# Patient Record
Sex: MALE | Race: WHITE | URBAN - METROPOLITAN AREA
[De-identification: names, ages, dates, MRNs, and addresses within clinical notes are randomized per-mention and may not be internally consistent; named-entity substitution may affect disease eponyms.]

---

## 2023-01-10 ENCOUNTER — EMERGENCY (EMERGENCY)
Facility: HOSPITAL | Age: 33
LOS: 1 days | Discharge: HOME | End: 2023-01-10
Attending: STUDENT IN AN ORGANIZED HEALTH CARE EDUCATION/TRAINING PROGRAM | Admitting: STUDENT IN AN ORGANIZED HEALTH CARE EDUCATION/TRAINING PROGRAM
Payer: MEDICAID

## 2023-01-10 VITALS
HEART RATE: 100 BPM | SYSTOLIC BLOOD PRESSURE: 148 MMHG | DIASTOLIC BLOOD PRESSURE: 66 MMHG | TEMPERATURE: 99 F | RESPIRATION RATE: 18 BRPM | OXYGEN SATURATION: 98 %

## 2023-01-10 VITALS
OXYGEN SATURATION: 98 % | HEIGHT: 72 IN | TEMPERATURE: 98 F | RESPIRATION RATE: 20 BRPM | DIASTOLIC BLOOD PRESSURE: 64 MMHG | SYSTOLIC BLOOD PRESSURE: 129 MMHG | HEART RATE: 117 BPM | WEIGHT: 179.9 LBS

## 2023-01-10 DIAGNOSIS — F14.10 COCAINE ABUSE, UNCOMPLICATED: ICD-10-CM

## 2023-01-10 DIAGNOSIS — F19.90 OTHER PSYCHOACTIVE SUBSTANCE USE, UNSPECIFIED, UNCOMPLICATED: ICD-10-CM

## 2023-01-10 LAB
ALBUMIN SERPL ELPH-MCNC: 4.6 G/DL — SIGNIFICANT CHANGE UP (ref 3.3–5)
ALP SERPL-CCNC: 83 U/L — SIGNIFICANT CHANGE UP (ref 40–120)
ALT FLD-CCNC: 65 U/L — HIGH (ref 10–45)
ANION GAP SERPL CALC-SCNC: 16 MMOL/L — SIGNIFICANT CHANGE UP (ref 5–17)
ANISOCYTOSIS BLD QL: SLIGHT — SIGNIFICANT CHANGE UP
APAP SERPL-MCNC: <5 UG/ML — LOW (ref 10–30)
APPEARANCE UR: CLEAR — SIGNIFICANT CHANGE UP
APTT BLD: 33.3 SEC — SIGNIFICANT CHANGE UP (ref 27.5–35.5)
AST SERPL-CCNC: 89 U/L — HIGH (ref 10–40)
BASOPHILS # BLD AUTO: 0 K/UL — SIGNIFICANT CHANGE UP (ref 0–0.2)
BASOPHILS NFR BLD AUTO: 0 % — SIGNIFICANT CHANGE UP (ref 0–2)
BILIRUB SERPL-MCNC: 0.7 MG/DL — SIGNIFICANT CHANGE UP (ref 0.2–1.2)
BILIRUB UR-MCNC: NEGATIVE — SIGNIFICANT CHANGE UP
BUN SERPL-MCNC: 9 MG/DL — SIGNIFICANT CHANGE UP (ref 7–23)
CALCIUM SERPL-MCNC: 9.1 MG/DL — SIGNIFICANT CHANGE UP (ref 8.4–10.5)
CHLORIDE SERPL-SCNC: 99 MMOL/L — SIGNIFICANT CHANGE UP (ref 96–108)
CO2 SERPL-SCNC: 24 MMOL/L — SIGNIFICANT CHANGE UP (ref 22–31)
COLOR SPEC: YELLOW — SIGNIFICANT CHANGE UP
CREAT SERPL-MCNC: 0.62 MG/DL — SIGNIFICANT CHANGE UP (ref 0.5–1.3)
DACRYOCYTES BLD QL SMEAR: SLIGHT — SIGNIFICANT CHANGE UP
DIFF PNL FLD: NEGATIVE — SIGNIFICANT CHANGE UP
EGFR: 129 ML/MIN/1.73M2 — SIGNIFICANT CHANGE UP
EOSINOPHIL # BLD AUTO: 0.19 K/UL — SIGNIFICANT CHANGE UP (ref 0–0.5)
EOSINOPHIL NFR BLD AUTO: 1.7 % — SIGNIFICANT CHANGE UP (ref 0–6)
ETHANOL SERPL-MCNC: 64 MG/DL — HIGH (ref 0–10)
GLUCOSE SERPL-MCNC: 84 MG/DL — SIGNIFICANT CHANGE UP (ref 70–99)
GLUCOSE UR QL: NEGATIVE — SIGNIFICANT CHANGE UP
HCT VFR BLD CALC: 48.4 % — SIGNIFICANT CHANGE UP (ref 39–50)
HGB BLD-MCNC: 16.9 G/DL — SIGNIFICANT CHANGE UP (ref 13–17)
INR BLD: 0.99 — SIGNIFICANT CHANGE UP (ref 0.88–1.16)
KETONES UR-MCNC: ABNORMAL MG/DL
LEUKOCYTE ESTERASE UR-ACNC: NEGATIVE — SIGNIFICANT CHANGE UP
LYMPHOCYTES # BLD AUTO: 0.39 K/UL — LOW (ref 1–3.3)
LYMPHOCYTES # BLD AUTO: 3.5 % — LOW (ref 13–44)
MACROCYTES BLD QL: SLIGHT — SIGNIFICANT CHANGE UP
MANUAL SMEAR VERIFICATION: SIGNIFICANT CHANGE UP
MCHC RBC-ENTMCNC: 33.1 PG — SIGNIFICANT CHANGE UP (ref 27–34)
MCHC RBC-ENTMCNC: 34.9 GM/DL — SIGNIFICANT CHANGE UP (ref 32–36)
MCV RBC AUTO: 94.9 FL — SIGNIFICANT CHANGE UP (ref 80–100)
MONOCYTES # BLD AUTO: 0.58 K/UL — SIGNIFICANT CHANGE UP (ref 0–0.9)
MONOCYTES NFR BLD AUTO: 5.2 % — SIGNIFICANT CHANGE UP (ref 2–14)
NEUTROPHILS # BLD AUTO: 9.94 K/UL — HIGH (ref 1.8–7.4)
NEUTROPHILS NFR BLD AUTO: 88.7 % — HIGH (ref 43–77)
NEUTS BAND # BLD: 0.9 % — SIGNIFICANT CHANGE UP (ref 0–8)
NITRITE UR-MCNC: NEGATIVE — SIGNIFICANT CHANGE UP
OVALOCYTES BLD QL SMEAR: SLIGHT — SIGNIFICANT CHANGE UP
PH UR: 6 — SIGNIFICANT CHANGE UP (ref 5–8)
PLAT MORPH BLD: NORMAL — SIGNIFICANT CHANGE UP
PLATELET # BLD AUTO: 283 K/UL — SIGNIFICANT CHANGE UP (ref 150–400)
POIKILOCYTOSIS BLD QL AUTO: SLIGHT — SIGNIFICANT CHANGE UP
POLYCHROMASIA BLD QL SMEAR: SLIGHT — SIGNIFICANT CHANGE UP
POTASSIUM SERPL-MCNC: 3.8 MMOL/L — SIGNIFICANT CHANGE UP (ref 3.5–5.3)
POTASSIUM SERPL-SCNC: 3.8 MMOL/L — SIGNIFICANT CHANGE UP (ref 3.5–5.3)
PROT SERPL-MCNC: 7.5 G/DL — SIGNIFICANT CHANGE UP (ref 6–8.3)
PROT UR-MCNC: NEGATIVE MG/DL — SIGNIFICANT CHANGE UP
PROTHROM AB SERPL-ACNC: 11.8 SEC — SIGNIFICANT CHANGE UP (ref 10.5–13.4)
RAPID RVP RESULT: DETECTED
RBC # BLD: 5.1 M/UL — SIGNIFICANT CHANGE UP (ref 4.2–5.8)
RBC # FLD: 13.3 % — SIGNIFICANT CHANGE UP (ref 10.3–14.5)
RBC BLD AUTO: ABNORMAL
RV+EV RNA SPEC QL NAA+PROBE: DETECTED
SALICYLATES SERPL-MCNC: <0.3 MG/DL — LOW (ref 2.8–20)
SARS-COV-2 RNA SPEC QL NAA+PROBE: SIGNIFICANT CHANGE UP
SODIUM SERPL-SCNC: 139 MMOL/L — SIGNIFICANT CHANGE UP (ref 135–145)
SP GR SPEC: 1.02 — SIGNIFICANT CHANGE UP (ref 1–1.03)
TSH SERPL-MCNC: 1.02 UIU/ML — SIGNIFICANT CHANGE UP (ref 0.27–4.2)
UROBILINOGEN FLD QL: 1 E.U./DL — SIGNIFICANT CHANGE UP
WBC # BLD: 11.09 K/UL — HIGH (ref 3.8–10.5)
WBC # FLD AUTO: 11.09 K/UL — HIGH (ref 3.8–10.5)

## 2023-01-10 PROCEDURE — 90792 PSYCH DIAG EVAL W/MED SRVCS: CPT

## 2023-01-10 PROCEDURE — 80307 DRUG TEST PRSMV CHEM ANLYZR: CPT

## 2023-01-10 PROCEDURE — 85730 THROMBOPLASTIN TIME PARTIAL: CPT

## 2023-01-10 PROCEDURE — 0225U NFCT DS DNA&RNA 21 SARSCOV2: CPT

## 2023-01-10 PROCEDURE — 71046 X-RAY EXAM CHEST 2 VIEWS: CPT

## 2023-01-10 PROCEDURE — 81003 URINALYSIS AUTO W/O SCOPE: CPT

## 2023-01-10 PROCEDURE — 99285 EMERGENCY DEPT VISIT HI MDM: CPT

## 2023-01-10 PROCEDURE — 85025 COMPLETE CBC W/AUTO DIFF WBC: CPT

## 2023-01-10 PROCEDURE — 36415 COLL VENOUS BLD VENIPUNCTURE: CPT

## 2023-01-10 PROCEDURE — 85610 PROTHROMBIN TIME: CPT

## 2023-01-10 PROCEDURE — 93005 ELECTROCARDIOGRAM TRACING: CPT

## 2023-01-10 PROCEDURE — 71046 X-RAY EXAM CHEST 2 VIEWS: CPT | Mod: 26

## 2023-01-10 PROCEDURE — 80053 COMPREHEN METABOLIC PANEL: CPT

## 2023-01-10 PROCEDURE — 84443 ASSAY THYROID STIM HORMONE: CPT

## 2023-01-10 RX ORDER — SODIUM CHLORIDE 9 MG/ML
1000 INJECTION INTRAMUSCULAR; INTRAVENOUS; SUBCUTANEOUS ONCE
Refills: 0 | Status: COMPLETED | OUTPATIENT
Start: 2023-01-10 | End: 2023-01-10

## 2023-01-10 RX ADMIN — SODIUM CHLORIDE 1000 MILLILITER(S): 9 INJECTION INTRAMUSCULAR; INTRAVENOUS; SUBCUTANEOUS at 10:55

## 2023-01-10 RX ADMIN — Medication 50 MILLIGRAM(S): at 12:27

## 2023-01-10 NOTE — ED PROVIDER NOTE - OBJECTIVE STATEMENT
31 y/o M with no PMHx and remote h/o back surgery presents to ED with 2 days of cough with nonbloody sputum production, myalgias, chills, and SOB. Pt also reports depression, anxiety, and intermittent thoughts of self harm. Denies any current thoughts of SI, VH, or AH. Pt states he smokes, drinks, and uses cocaine via snorting but denies h/o IVDU. Last used cocaine yesterday. Denies chest pain, leg swelling, nausea, vomiting, hormone use, recent travel/surgeries/hospitalizations, or personal/FHx of DVT/PE.

## 2023-01-10 NOTE — ED PROVIDER NOTE - PATIENT PORTAL LINK FT
You can access the FollowMyHealth Patient Portal offered by [facility] by registering at the following website: [email or web address]. By joining [facility]’s FollowMyHealth portal, you will also be able to view your health information using other applications (apps) compatible with our system.

## 2023-01-10 NOTE — ED BEHAVIORAL HEALTH ASSESSMENT NOTE - SAFETY PLAN ADDT'L DETAILS
Provision of National Suicide Prevention Lifeline 5-250-787-TALK (5177) Provision of National Suicide Prevention Lifeline 5-719-156-TALK

## 2023-01-10 NOTE — ED PROVIDER NOTE - PHYSICAL EXAMINATION
GENERAL: Well appearing, awake, alert and in NAD  ENMT: Airway patent.   EYES: Conjunctiva clear.   CARDIAC: Regular rate, regular rhythm.  Heart sounds S1, S2, no S3, S4. No murmurs, rubs or gallops.  RESPIRATORY: Breath sounds clear and equal in bilateral anterior lung fields, no wheezes/rhonchi/stridor; pt breathing and speaking comfortably with no increased WOB. No accessory mm. use, no intercostal retractions, no nasal flaring.   GI: Abdomen soft, non-distended, non-tender, no rebound or guarding.  MSK: No b/l LE edema.   PSYCH: Calm and cooperative.

## 2023-01-10 NOTE — ED PROVIDER NOTE - NSFOLLOWUPINSTRUCTIONS_ED_ALL_ED_FT
Please reach out to Izabella Johnson ([facility] ED clinical referral coordinator) to assist you with your follow-up appointment.     Monday - Friday 11am-7pm  [phone]  [email or web address]     1. You were seen for cough. A copy of any of your resulted labs, imaging, and findings have been provided to you. Make sure to view any test results that may not have yet resulted at the time of your discharge by creating a FollowMyHealth account at: [email or web address] to sign up for FollowMyHealth. Please review all of your lab, imaging, and all other results in their entirety with your primary care doctor.   2. Continue to take your home medications as prescribed.  librium from your pharmacy and take the medication as prescribed on the bottle's manufacterer's label, and consult a pharmacist or your primary care doctor with any questions.   3. Follow up with addiction services, a psychiatrist, and your primary care doctor within 48 hours to assess the symptoms you were seen for in the emergency department and to review all results from your visit. If you don't have a doctor, call [phone] to make an appointment.  4. Return immediately to the emergency department for new, persistent, or worsening symptoms or signs. Return immediately to the emergency department if you have chest pain, shortness of breath, loss of consciousness, thoughts of self-harm, or shortness of breath.   5. For your for health, you should make healthy food choices and be physically active. Also, you should not smoke or use drugs, and you should not drink alcohol in excess. Please visit [email or web address] for resources and more information.

## 2023-01-10 NOTE — ED BEHAVIORAL HEALTH ASSESSMENT NOTE - RISK ASSESSMENT
Static: Hx of substance abuse  Modifiable: Not engaged in tx  Protective: Fear of death/dying   Current Risk: Low  Current risk will be mitigated by tending to pt needs.
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
## 2023-01-10 NOTE — ED PROVIDER NOTE - PROGRESS NOTE DETAILS
pt cleared by psychiatry who say 1:1 can be discontinued resources given to pt for substance abuse resources awaiting labs results HR improved, etoh level 60s, mild transaminitis, entero/rhinovirus+, cxr clear. On reassessment pt awake alert, states he is hungry. no nystagmus bilat gait wnl mild tremors in hands mild tongue fasciculations. pt given librium. ciwa 3. pt given addition service resources and a few days course of librium. is amenable to followup outpatient with addiction service and psychiatric resources. Patient with capacity and insight, in control of faculties, speaking in full sentences without slurring of words, no nystagmus, no ataxia, and appropriate without further medical complaint.

## 2023-01-10 NOTE — ED PROVIDER NOTE - CLINICAL SUMMARY MEDICAL DECISION MAKING FREE TEXT BOX
33 y/o M presents to ED with 2 days of cough with nonbloody sputum production, myalgias, chills, and SOB. Pt also reports depression, anxiety, and intermittent thoughts of self harm.    On exam, HR is 117. Vital signs are otherwise normal. No remarkable exam findings.     DDx: viral illness vs flu vs RSV vs pneumonia vs intoxication vs SI vs depression. Pt is tachycardic with SOB, however this is associated with viral syndrome. Pt does not have any DVT or PE risk factors.     Plan:   - labs including labs for psych clearance  - EKG  - CXR  - UA   - RVP  - 1:1 ordered  - IV fluids  - psych consult  - reassess

## 2023-01-13 DIAGNOSIS — R74.01 ELEVATION OF LEVELS OF LIVER TRANSAMINASE LEVELS: ICD-10-CM

## 2023-01-13 DIAGNOSIS — F14.90 COCAINE USE, UNSPECIFIED, UNCOMPLICATED: ICD-10-CM

## 2023-01-13 DIAGNOSIS — B34.1 ENTEROVIRUS INFECTION, UNSPECIFIED: ICD-10-CM

## 2023-01-13 DIAGNOSIS — F17.200 NICOTINE DEPENDENCE, UNSPECIFIED, UNCOMPLICATED: ICD-10-CM

## 2023-01-13 DIAGNOSIS — F41.8 OTHER SPECIFIED ANXIETY DISORDERS: ICD-10-CM

## 2023-01-13 DIAGNOSIS — R25.3 FASCICULATION: ICD-10-CM

## 2023-01-13 DIAGNOSIS — R05.1 ACUTE COUGH: ICD-10-CM

## 2023-01-13 DIAGNOSIS — Z20.822 CONTACT WITH AND (SUSPECTED) EXPOSURE TO COVID-19: ICD-10-CM

## 2023-01-21 ENCOUNTER — EMERGENCY (EMERGENCY)
Facility: HOSPITAL | Age: 33
LOS: 1 days | Discharge: HOME | End: 2023-01-21
Admitting: EMERGENCY MEDICINE
Payer: MEDICAID

## 2023-01-21 VITALS
TEMPERATURE: 98 F | WEIGHT: 179.9 LBS | SYSTOLIC BLOOD PRESSURE: 130 MMHG | DIASTOLIC BLOOD PRESSURE: 88 MMHG | HEIGHT: 72 IN | HEART RATE: 107 BPM | OXYGEN SATURATION: 98 % | RESPIRATION RATE: 16 BRPM

## 2023-01-21 VITALS
OXYGEN SATURATION: 99 % | DIASTOLIC BLOOD PRESSURE: 80 MMHG | HEART RATE: 78 BPM | RESPIRATION RATE: 17 BRPM | SYSTOLIC BLOOD PRESSURE: 111 MMHG

## 2023-01-21 PROCEDURE — 72125 CT NECK SPINE W/O DYE: CPT | Mod: 26

## 2023-01-21 PROCEDURE — 70450 CT HEAD/BRAIN W/O DYE: CPT | Mod: 26

## 2023-01-21 PROCEDURE — 99284 EMERGENCY DEPT VISIT MOD MDM: CPT

## 2023-01-21 RX ORDER — MIDAZOLAM HYDROCHLORIDE 1 MG/ML
5 INJECTION, SOLUTION INTRAMUSCULAR; INTRAVENOUS ONCE
Refills: 0 | Status: DISCONTINUED | OUTPATIENT
Start: 2023-01-21 | End: 2023-01-21

## 2023-01-21 RX ORDER — HALOPERIDOL DECANOATE 100 MG/ML
5 INJECTION INTRAMUSCULAR ONCE
Refills: 0 | Status: COMPLETED | OUTPATIENT
Start: 2023-01-21 | End: 2023-01-21

## 2023-01-21 RX ORDER — HALOPERIDOL DECANOATE 100 MG/ML
5 INJECTION INTRAMUSCULAR ONCE
Refills: 0 | Status: DISCONTINUED | OUTPATIENT
Start: 2023-01-21 | End: 2023-01-24

## 2023-01-21 RX ADMIN — MIDAZOLAM HYDROCHLORIDE 5 MILLIGRAM(S): 1 INJECTION, SOLUTION INTRAMUSCULAR; INTRAVENOUS at 04:31

## 2023-01-21 RX ADMIN — HALOPERIDOL DECANOATE 5 MILLIGRAM(S): 100 INJECTION INTRAMUSCULAR at 04:30

## 2023-01-21 NOTE — ED ADULT NURSE NOTE - OBJECTIVE STATEMENT
31 y/o male patient here for eval of AMS; patient was bibems found on floor by beth benitez. patient is alert and verbal upon assessment. pending provider eval.

## 2023-01-21 NOTE — ED ADULT NURSE REASSESSMENT NOTE - NS ED NURSE REASSESS COMMENT FT1
Pt placed next to nursing station upon ED arrival. Pt then heard and seen by staff at nursing station attempting to get out of stretcher. Nursing staff and NISHA Lundy attempted to verbally redirect pt. Pt non-redirectable, combative, and physically aggressive. Staff attempted to get to pt prior to pt getting out of bed. Pt climbed out of stretcher and threw self on floor prior to staff being able to physically redirect pt. Abrasion noted to chin. No LOC. Pt denies pain. Pt placed next to nursing station upon ED arrival. Pt then heard and seen by staff at nursing station attempting to get out of stretcher. Nursing staff and NISHA Lundy attempted to verbally redirect pt. Pt non-redirectable, combative, and physically aggressive. Staff attempted to get to pt prior to pt getting out of bed. Pt climbed out of stretcher and threw self on floor prior to staff being able to physically redirect pt. Abrasion noted to chin. No LOC. Pt denies pain. MD Welch at bedside for evaluation of patient post fall.

## 2023-01-21 NOTE — ED PROVIDER NOTE - NSFOLLOWUPINSTRUCTIONS_ED_ALL_ED_FT
Follow up with your primary care doctor or clinics listed below if you do not have a doctor  [address] [address]  To make an appointment, call [phone]  [facility]  Address: [address] [place] [id number]  Appointment Center: [phone] ([phone])     Return for any concerns

## 2023-01-21 NOTE — ED PROVIDER NOTE - PATIENT PORTAL LINK FT
You can access the FollowMyHealth Patient Portal offered by [facility] by registering at the following website: [email or web address]. By joining [employer]’s FollowMyHealth portal, you will also be able to view your health information using other applications (apps) compatible with our system.

## 2023-01-21 NOTE — ED ADULT NURSE REASSESSMENT NOTE - NS ED NURSE REASSESS COMMENT FT1
rec'd patient in yellow gown, is awake and alert following simple commands, awaiting CT results. repeat VSS during change of shift are WNL. Will monitor closely

## 2023-01-21 NOTE — ED PROVIDER NOTE - OBJECTIVE STATEMENT
31 yo m bibems for AMS, possibly 2/2 substance use, no trauma reported.  limited history 2/2 mental status.    I have reviewed available current nursing and previous documentation of past medical, surgical, family, and/or social history.
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
## 2023-01-21 NOTE — ED ADULT NURSE NOTE - NSSUHOSCREENINGYN_ED_ALL_ED
----- Message from Diana Jiang sent at 7/11/2018  2:17 PM CDT -----  Contact: patient mother  Please call above patient mother missed your call   Yes - the patient is able to be screened

## 2023-01-23 DIAGNOSIS — R45.1 RESTLESSNESS AND AGITATION: ICD-10-CM

## 2023-01-23 DIAGNOSIS — R41.82 ALTERED MENTAL STATUS, UNSPECIFIED: ICD-10-CM

## 2023-01-23 DIAGNOSIS — F10.99 ALCOHOL USE, UNSPECIFIED WITH UNSPECIFIED ALCOHOL-INDUCED DISORDER: ICD-10-CM

## 2023-06-18 ENCOUNTER — EMERGENCY (EMERGENCY)
Facility: HOSPITAL | Age: 33
LOS: 1 days | Discharge: HOME | End: 2023-06-18
Attending: EMERGENCY MEDICINE | Admitting: EMERGENCY MEDICINE
Payer: MEDICAID

## 2023-06-18 VITALS
HEIGHT: 73 IN | OXYGEN SATURATION: 96 % | SYSTOLIC BLOOD PRESSURE: 115 MMHG | HEART RATE: 104 BPM | WEIGHT: 186.07 LBS | TEMPERATURE: 99 F | RESPIRATION RATE: 18 BRPM | DIASTOLIC BLOOD PRESSURE: 80 MMHG
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
## 2023-06-18 DIAGNOSIS — S70.01XA CONTUSION OF RIGHT HIP, INITIAL ENCOUNTER: ICD-10-CM

## 2023-06-18 DIAGNOSIS — S80.811A ABRASION, RIGHT LOWER LEG, INITIAL ENCOUNTER: ICD-10-CM

## 2023-06-18 DIAGNOSIS — R29.6 REPEATED FALLS: ICD-10-CM
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
## 2023-06-18 DIAGNOSIS — S80.812A ABRASION, LEFT LOWER LEG, INITIAL ENCOUNTER: ICD-10-CM

## 2023-06-18 PROCEDURE — 99284 EMERGENCY DEPT VISIT MOD MDM: CPT

## 2023-06-18 PROCEDURE — 73502 X-RAY EXAM HIP UNI 2-3 VIEWS: CPT

## 2023-06-18 PROCEDURE — 73502 X-RAY EXAM HIP UNI 2-3 VIEWS: CPT | Mod: 26,RT

## 2023-06-18 PROCEDURE — 71046 X-RAY EXAM CHEST 2 VIEWS: CPT

## 2023-06-18 PROCEDURE — 71046 X-RAY EXAM CHEST 2 VIEWS: CPT | Mod: 26

## 2023-06-18 PROCEDURE — 99284 EMERGENCY DEPT VISIT MOD MDM: CPT | Mod: 25

## 2023-06-18 RX ORDER — ACETAMINOPHEN 500 MG
650 TABLET ORAL ONCE
Refills: 0 | Status: COMPLETED | OUTPATIENT
Start: 2023-06-18 | End: 2023-06-18

## 2023-06-18 RX ADMIN — Medication 650 MILLIGRAM(S): at 12:55

## 2023-06-18 NOTE — ED ADULT NURSE NOTE - NSFALLRISKINTERV_ED_ALL_ED

## 2023-06-18 NOTE — ED ADULT NURSE NOTE - NSFALLTYPE_ED_ALL_ED
Refill Approved    Rx renewed per Medication Renewal Policy. Medication was last renewed on 10/8/19, last OV 12/2/19.    Lisa Esparza, [facility] Triage/Med Refill 7/4/2020     Requested Prescriptions   Pending Prescriptions Disp Refills     atorvastatin (LIPITOR) 10 MG tablet [Pharmacy Med Name: ATORVASTATIN 10MG TABLETS] 90 tablet 2     Sig: TAKE 1 TABLET BY MOUTH EVERY DAY       Statins Refill Protocol (Hmg CoA Reductase Inhibitors) Passed - 7/3/2020  9:03 AM        Passed - PCP or prescribing provider visit in past 12 months      Last office visit with prescriber/PCP: 12/2/2019 Deepa Short MD OR same dept: 12/2/2019 Deepa Short MD OR same specialty: 12/2/2019 Deepa Short MD  Last physical: Visit date not found Last MTM visit: Visit date not found   Next visit within 3 mo: Visit date not found  Next physical within 3 mo: Visit date not found  Prescriber OR PCP: Deepa Short MD  Last diagnosis associated with med order: 1. Serum Total Cholesterol Was Elevated  - atorvastatin (LIPITOR) 10 MG tablet [Pharmacy Med Name: ATORVASTATIN 10MG TABLETS]; TAKE 1 TABLET BY MOUTH EVERY DAY  Dispense: 90 tablet; Refill: 2    If protocol passes may refill for 12 months if within 3 months of last provider visit (or a total of 15 months).                             Single mechanical
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
## 2023-06-18 NOTE — ED PROVIDER NOTE - PHYSICAL EXAMINATION
CONSTITUTIONAL: In no apparent distress.   HEAD: Normocephalic; atraumatic.   EYES:  conjunctiva and sclera clear  ENT: normal nose; no rhinorrhea; normal pharynx with no erythema or lesions.   NECK: Supple; non-tender;   CARDIOVASCULAR: Normal S1, S2; no murmurs, rubs, or gallops. Regular rate and rhythm.   RESPIRATORY: Breathing easily; breath sounds clear and equal bilaterally; no wheezes, rhonchi, or rales.  GI: Soft; non-distended; non-tender; no palpable organomegaly.   EXT: No cyanosis or edema; N/V intact   BACK: no C, T and L spinal tenderness. No bruising seen over R hip pointer w/ tenderness. Ribs are nontender to touch.   SKIN: Normal for age and race; warm; dry; good turgor; no apparent lesions or rash.   NEURO: A & O x 3; face symmetric; grossly unremarkable.   PSYCHOLOGICAL: The patient’s mood and manner are appropriate.
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
## 2023-06-18 NOTE — ED PROVIDER NOTE - PATIENT PORTAL LINK FT
You can access the FollowMyHealth Patient Portal offered by [facility] by registering at the following website: [email or web address]. By joining [employer]’s FollowMyHealth portal, you will also be able to view your health information using other applications (apps) compatible with our system.

## 2023-07-12 ENCOUNTER — EMERGENCY (EMERGENCY)
Facility: HOSPITAL | Age: 33
LOS: 1 days | Discharge: HOME | End: 2023-07-12
Attending: EMERGENCY MEDICINE | Admitting: EMERGENCY MEDICINE
Payer: MEDICAID

## 2023-07-12 VITALS
DIASTOLIC BLOOD PRESSURE: 80 MMHG | OXYGEN SATURATION: 100 % | RESPIRATION RATE: 18 BRPM | SYSTOLIC BLOOD PRESSURE: 114 MMHG | TEMPERATURE: 98 F | HEART RATE: 75 BPM

## 2023-07-12 VITALS
RESPIRATION RATE: 20 BRPM | WEIGHT: 169.98 LBS | HEART RATE: 95 BPM | HEIGHT: 73 IN | OXYGEN SATURATION: 95 % | DIASTOLIC BLOOD PRESSURE: 92 MMHG | SYSTOLIC BLOOD PRESSURE: 135 MMHG | TEMPERATURE: 99 F

## 2023-07-12 VITALS
WEIGHT: 169.98 LBS | DIASTOLIC BLOOD PRESSURE: 88 MMHG | RESPIRATION RATE: 18 BRPM | SYSTOLIC BLOOD PRESSURE: 123 MMHG | OXYGEN SATURATION: 95 % | HEIGHT: 73 IN | TEMPERATURE: 99 F | HEART RATE: 107 BPM

## 2023-07-12 DIAGNOSIS — F10.90 ALCOHOL USE, UNSPECIFIED, UNCOMPLICATED: ICD-10-CM

## 2023-07-12 DIAGNOSIS — F10.239 ALCOHOL DEPENDENCE WITH WITHDRAWAL, UNSPECIFIED: ICD-10-CM

## 2023-07-12 DIAGNOSIS — Z71.51 DRUG ABUSE COUNSELING AND SURVEILLANCE OF DRUG ABUSER: ICD-10-CM

## 2023-07-12 PROBLEM — F10.10 ALCOHOL ABUSE, UNCOMPLICATED: Chronic | Status: ACTIVE | Noted: 2023-06-18

## 2023-07-12 LAB
ALBUMIN SERPL ELPH-MCNC: 3.7 G/DL — SIGNIFICANT CHANGE UP (ref 3.4–5)
ALP SERPL-CCNC: 113 U/L — SIGNIFICANT CHANGE UP (ref 40–120)
ALT FLD-CCNC: 63 U/L — HIGH (ref 12–42)
ANION GAP SERPL CALC-SCNC: 7 MMOL/L — LOW (ref 9–16)
APTT BLD: 29.7 SEC — SIGNIFICANT CHANGE UP (ref 27.5–35.5)
AST SERPL-CCNC: 50 U/L — HIGH (ref 15–37)
BASOPHILS # BLD AUTO: 0.02 K/UL — SIGNIFICANT CHANGE UP (ref 0–0.2)
BASOPHILS NFR BLD AUTO: 0.4 % — SIGNIFICANT CHANGE UP (ref 0–2)
BILIRUB DIRECT SERPL-MCNC: 0.2 MG/DL — SIGNIFICANT CHANGE UP (ref 0–0.3)
BILIRUB INDIRECT FLD-MCNC: 0.2 MG/DL — SIGNIFICANT CHANGE UP (ref 0.2–1)
BILIRUB SERPL-MCNC: 0.4 MG/DL — SIGNIFICANT CHANGE UP (ref 0.2–1.2)
BILIRUB SERPL-MCNC: 0.4 MG/DL — SIGNIFICANT CHANGE UP (ref 0.2–1.2)
BUN SERPL-MCNC: 3 MG/DL — LOW (ref 7–23)
CALCIUM SERPL-MCNC: 9 MG/DL — SIGNIFICANT CHANGE UP (ref 8.5–10.5)
CHLORIDE SERPL-SCNC: 100 MMOL/L — SIGNIFICANT CHANGE UP (ref 96–108)
CO2 SERPL-SCNC: 31 MMOL/L — SIGNIFICANT CHANGE UP (ref 22–31)
CREAT SERPL-MCNC: 0.92 MG/DL — SIGNIFICANT CHANGE UP (ref 0.5–1.3)
EGFR: 112 ML/MIN/1.73M2 — SIGNIFICANT CHANGE UP
EOSINOPHIL # BLD AUTO: 0.01 K/UL — SIGNIFICANT CHANGE UP (ref 0–0.5)
EOSINOPHIL NFR BLD AUTO: 0.2 % — SIGNIFICANT CHANGE UP (ref 0–6)
ETHANOL SERPL-MCNC: 10 MG/DL — HIGH
GLUCOSE BLDC GLUCOMTR-MCNC: 95 MG/DL — SIGNIFICANT CHANGE UP (ref 70–99)
GLUCOSE SERPL-MCNC: 98 MG/DL — SIGNIFICANT CHANGE UP (ref 70–99)
HCT VFR BLD CALC: 42.9 % — SIGNIFICANT CHANGE UP (ref 39–50)
HCT VFR BLD CALC: 47.3 % — SIGNIFICANT CHANGE UP (ref 39–50)
HGB BLD-MCNC: 15 G/DL — SIGNIFICANT CHANGE UP (ref 13–17)
HGB BLD-MCNC: 16.6 G/DL — SIGNIFICANT CHANGE UP (ref 13–17)
HIV 1 & 2 AB SERPL IA.RAPID: SIGNIFICANT CHANGE UP
IMM GRANULOCYTES NFR BLD AUTO: 0.2 % — SIGNIFICANT CHANGE UP (ref 0–0.9)
INR BLD: 1.07 — SIGNIFICANT CHANGE UP (ref 0.88–1.16)
LIDOCAIN IGE QN: 59 U/L — LOW (ref 73–393)
LYMPHOCYTES # BLD AUTO: 1.83 K/UL — SIGNIFICANT CHANGE UP (ref 1–3.3)
LYMPHOCYTES # BLD AUTO: 32.7 % — SIGNIFICANT CHANGE UP (ref 13–44)
MAGNESIUM SERPL-MCNC: 2.1 MG/DL — SIGNIFICANT CHANGE UP (ref 1.6–2.6)
MCHC RBC-ENTMCNC: 34.4 PG — HIGH (ref 27–34)
MCHC RBC-ENTMCNC: 34.6 PG — HIGH (ref 27–34)
MCHC RBC-ENTMCNC: 35 GM/DL — SIGNIFICANT CHANGE UP (ref 32–36)
MCHC RBC-ENTMCNC: 35.1 GM/DL — SIGNIFICANT CHANGE UP (ref 32–36)
MCV RBC AUTO: 97.9 FL — SIGNIFICANT CHANGE UP (ref 80–100)
MCV RBC AUTO: 98.8 FL — SIGNIFICANT CHANGE UP (ref 80–100)
MONOCYTES # BLD AUTO: 0.69 K/UL — SIGNIFICANT CHANGE UP (ref 0–0.9)
MONOCYTES NFR BLD AUTO: 12.3 % — SIGNIFICANT CHANGE UP (ref 2–14)
NEUTROPHILS # BLD AUTO: 3.04 K/UL — SIGNIFICANT CHANGE UP (ref 1.8–7.4)
NEUTROPHILS NFR BLD AUTO: 54.2 % — SIGNIFICANT CHANGE UP (ref 43–77)
NRBC # BLD: 0 /100 WBCS — SIGNIFICANT CHANGE UP (ref 0–0)
NRBC # BLD: 0 /100 WBCS — SIGNIFICANT CHANGE UP (ref 0–0)
OB PNL STL: NEGATIVE — SIGNIFICANT CHANGE UP
PCO2 BLDV: 45 MMHG — SIGNIFICANT CHANGE UP (ref 42–55)
PH BLDV: 7.48 — HIGH (ref 7.32–7.43)
PLATELET # BLD AUTO: 216 K/UL — SIGNIFICANT CHANGE UP (ref 150–400)
PLATELET # BLD AUTO: 266 K/UL — SIGNIFICANT CHANGE UP (ref 150–400)
PO2 BLDV: 51 MMHG — HIGH (ref 25–45)
POTASSIUM SERPL-MCNC: 4.4 MMOL/L — SIGNIFICANT CHANGE UP (ref 3.5–5.3)
POTASSIUM SERPL-SCNC: 4.4 MMOL/L — SIGNIFICANT CHANGE UP (ref 3.5–5.3)
PROT SERPL-MCNC: 8.1 G/DL — SIGNIFICANT CHANGE UP (ref 6.4–8.2)
PROTHROM AB SERPL-ACNC: 12.5 SEC — SIGNIFICANT CHANGE UP (ref 10.5–13.4)
RBC # BLD: 4.34 M/UL — SIGNIFICANT CHANGE UP (ref 4.2–5.8)
RBC # BLD: 4.83 M/UL — SIGNIFICANT CHANGE UP (ref 4.2–5.8)
RBC # FLD: 14.3 % — SIGNIFICANT CHANGE UP (ref 10.3–14.5)
RBC # FLD: 14.5 % — SIGNIFICANT CHANGE UP (ref 10.3–14.5)
SAO2 % BLDV: 84.2 % — SIGNIFICANT CHANGE UP (ref 67–88)
SODIUM SERPL-SCNC: 138 MMOL/L — SIGNIFICANT CHANGE UP (ref 132–145)
TROPONIN I, HIGH SENSITIVITY RESULT: 4.3 NG/L — SIGNIFICANT CHANGE UP
WBC # BLD: 5.6 K/UL — SIGNIFICANT CHANGE UP (ref 3.8–10.5)
WBC # BLD: 5.9 K/UL — SIGNIFICANT CHANGE UP (ref 3.8–10.5)
WBC # FLD AUTO: 5.6 K/UL — SIGNIFICANT CHANGE UP (ref 3.8–10.5)
WBC # FLD AUTO: 5.9 K/UL — SIGNIFICANT CHANGE UP (ref 3.8–10.5)

## 2023-07-12 PROCEDURE — 99284 EMERGENCY DEPT VISIT MOD MDM: CPT

## 2023-07-12 PROCEDURE — 71046 X-RAY EXAM CHEST 2 VIEWS: CPT | Mod: 26

## 2023-07-12 PROCEDURE — 74177 CT ABD & PELVIS W/CONTRAST: CPT | Mod: 26

## 2023-07-12 PROCEDURE — 99285 EMERGENCY DEPT VISIT HI MDM: CPT

## 2023-07-12 RX ORDER — THIAMINE MONONITRATE (VIT B1) 100 MG
100 TABLET ORAL ONCE
Refills: 0 | Status: COMPLETED | OUTPATIENT
Start: 2023-07-12 | End: 2023-07-12

## 2023-07-12 RX ORDER — IOHEXOL 300 MG/ML
30 INJECTION, SOLUTION INTRAVENOUS ONCE
Refills: 0 | Status: COMPLETED | OUTPATIENT
Start: 2023-07-12 | End: 2023-07-12

## 2023-07-12 RX ORDER — FAMOTIDINE 10 MG/ML
20 INJECTION INTRAVENOUS ONCE
Refills: 0 | Status: COMPLETED | OUTPATIENT
Start: 2023-07-12 | End: 2023-07-12

## 2023-07-12 RX ORDER — ONDANSETRON 8 MG/1
4 TABLET, FILM COATED ORAL ONCE
Refills: 0 | Status: COMPLETED | OUTPATIENT
Start: 2023-07-12 | End: 2023-07-12

## 2023-07-12 RX ORDER — DIAZEPAM 5 MG
5 TABLET ORAL ONCE
Refills: 0 | Status: DISCONTINUED | OUTPATIENT
Start: 2023-07-12 | End: 2023-07-12

## 2023-07-12 RX ORDER — SODIUM CHLORIDE 9 MG/ML
1000 INJECTION INTRAMUSCULAR; INTRAVENOUS; SUBCUTANEOUS ONCE
Refills: 0 | Status: COMPLETED | OUTPATIENT
Start: 2023-07-12 | End: 2023-07-12

## 2023-07-12 RX ORDER — LIDOCAINE 4 G/100G
30 CREAM TOPICAL ONCE
Refills: 0 | Status: COMPLETED | OUTPATIENT
Start: 2023-07-12 | End: 2023-07-12

## 2023-07-12 RX ORDER — ACETAMINOPHEN 500 MG
975 TABLET ORAL ONCE
Refills: 0 | Status: COMPLETED | OUTPATIENT
Start: 2023-07-12 | End: 2023-07-12

## 2023-07-12 RX ADMIN — SODIUM CHLORIDE 1000 MILLILITER(S): 9 INJECTION INTRAMUSCULAR; INTRAVENOUS; SUBCUTANEOUS at 13:33

## 2023-07-12 RX ADMIN — IOHEXOL 30 MILLILITER(S): 300 INJECTION, SOLUTION INTRAVENOUS at 13:27

## 2023-07-12 RX ADMIN — Medication 30 MILLILITER(S): at 06:22

## 2023-07-12 RX ADMIN — Medication 50 MILLIGRAM(S): at 13:27

## 2023-07-12 RX ADMIN — ONDANSETRON 4 MILLIGRAM(S): 8 TABLET, FILM COATED ORAL at 13:33

## 2023-07-12 RX ADMIN — Medication 100 MILLIGRAM(S): at 13:34

## 2023-07-12 RX ADMIN — LIDOCAINE 30 MILLILITER(S): 4 CREAM TOPICAL at 06:25

## 2023-07-12 RX ADMIN — Medication 5 MILLIGRAM(S): at 13:30

## 2023-07-12 RX ADMIN — Medication 30 MILLILITER(S): at 14:50

## 2023-07-12 RX ADMIN — FAMOTIDINE 20 MILLIGRAM(S): 10 INJECTION INTRAVENOUS at 06:22

## 2023-07-12 RX ADMIN — FAMOTIDINE 20 MILLIGRAM(S): 10 INJECTION INTRAVENOUS at 14:50

## 2023-07-12 RX ADMIN — Medication 975 MILLIGRAM(S): at 13:28

## 2023-07-12 NOTE — ED ADULT TRIAGE NOTE - CHIEF COMPLAINT QUOTE
Pt walked in c/o etoh withdrawal, tremors, blood tinged emesis, and LLQ abdominal pain x today. Pt reports his last drink was last night at 2330. Reports history of eoth withdrawal seizures. Sepsis called in triage.

## 2023-07-12 NOTE — ED PROVIDER NOTE - NSFOLLOWUPINSTRUCTIONS_ED_ALL_ED_FT
Gastritis, Adult  Outline of an adult's lower body with a close-up of the stomach, showing inflammation and an ulcer inside the stomach.   Gastritis is inflammation of the stomach. There are two kinds of gastritis:  Acute gastritis. This kind develops suddenly.  Chronic gastritis. This kind is much more common. It develops slowly and lasts for a long time.  Gastritis happens when the lining of the stomach becomes weak or gets damaged. Without treatment, gastritis can lead to stomach bleeding and ulcers.    What are the causes?  This condition may be caused by:  An infection.  Drinking too much alcohol.  Certain medicines. These include steroids, antibiotics, and some over-the-counter medicines, such as aspirin or ibuprofen.  Having too much acid in the stomach.  Having a disease of the stomach.  Other causes may include:  An allergic reaction.  Some cancer treatments (radiation).  Smoking cigarettes or the use of products that contain nicotine or tobacco.  In some cases, the cause of this condition is not known.    What increases the risk?  Having a disease of the intestines.  Having a disease in which the body's immune system attacks the body (autoimmune disease), such as Crohn's disease.  Using aspirin or ibuprofen and other NSAIDs to treat other conditions, such as heart disease or chronic pain.  Stress.  What are the signs or symptoms?  Symptoms of this condition include:  Pain or a burning sensation in the upper abdomen.  Nausea.  Vomiting.  An uncomfortable feeling of fullness after eating.  Weight loss.  Bad breath.  Blood in your vomit or stool (feces).  In some cases, there are no symptoms.    How is this diagnosed?  This condition may be diagnosed based on your medical history, a physical exam, and tests. Tests may include:  Your medical history and a description of your symptoms.  A physical exam.  Tests. These can include:  Blood tests.  Stool tests.  A test in which a thin, flexible instrument with a light and a camera is passed down the esophagus and into the stomach (upper endoscopy).  A test in which a tissue sample is removed to look at it under a microscope (biopsy).  How is this treated?  This condition may be treated with medicines. The medicines that are used vary depending on the cause of the gastritis.  If the condition is caused by a bacterial infection, you may be given antibiotic medicines.  If the condition is caused by too much acid in the stomach, you may be given medicines called H2 blockers, proton pump inhibitors, or antacids.  Treatment may also involve stopping the use of certain medicines such as aspirin or ibuprofen and other NSAIDs.    Follow these instructions at home:  Medicines    Take over-the-counter and prescription medicines only as told by your health care provider.  If you were prescribed an antibiotic medicine, take it as told by your health care provider. Do not stop taking the antibiotic even if you start to feel better.  Alcohol use    Do not drink alcohol if:  Your health care provider tells you not to drink.  You are pregnant, may be pregnant, or are planning to become pregnant.  If you drink alcohol:  Limit your use to:  0–1 drink a day for women.  0–2 drinks a day for men.  Know how much alcohol is in your drink. In the [place] one drink equals one 12 oz bottle of beer (355 mL), one 5 oz glass of wine (148 mL), or one 1½ oz glass of hard liquor (44 mL).  General instructions    A comparison of three sample cups showing dark yellow, yellow, and pale yellow urine.  Eat small, frequent meals instead of large meals.  Avoid foods and drinks that make your symptoms worse.  Talk with your health care provider about ways to manage stress, such as getting regular exercise or practicing deep breathing, meditation, or yoga.  Do not use any products that contain nicotine or tobacco. These products include cigarettes, chewing tobacco, and vaping devices, such as e-cigarettes. If you need help quitting, ask your health care provider.  Drink enough fluid to keep your urine pale yellow.  Keep all follow-up visits. This is important.  Contact a health care provider if:  Your symptoms get worse.  Your abdominal pain gets worse.  Your symptoms return after treatment.  You have a fever.  Get help right away if:  You vomit blood or a substance that looks like coffee grounds.  You have black or dark red stools.  You are unable to keep fluids down.  These symptoms may represent a serious problem that is an emergency. Do not wait to see if the symptoms will go away. Get medical help right away. Call your local emergency services (911 in the [place] Do not drive yourself to the hospital.    Summary  Gastritis is inflammation of the lining of the stomach that can occur suddenly (acute) or develop slowly over time (chronic).  This condition is diagnosed with a medical history, a physical exam, or tests.  This condition may be treated with medicines to treat infection or medicines to reduce the amount of acid in your stomach.  Follow your health care provider's instructions about taking medicines, making changes to your diet, and knowing when to call for help.  This information is not intended to replace advice given to you by your health care provider. Make sure you discuss any questions you have with your health care provider.

## 2023-07-12 NOTE — ED PROVIDER NOTE - ATTENDING APP SHARED VISIT CONTRIBUTION OF CARE
32 yo M c/o GERD symptoms w/ etoh withdrawal    After meds, pt sleeping, CIWA 0    Dc to detox at outside hospital

## 2023-07-12 NOTE — ED PROVIDER NOTE - PATIENT PORTAL LINK FT
You can access the FollowMyHealth Patient Portal offered by [facility] by registering at the following website: [email or web address]. By joining [employer]’s FollowMyHealth portal, you will also be able to view your health information using other applications (apps) compatible with our system.

## 2023-07-12 NOTE — ED PROVIDER NOTE - CONSTITUTIONAL, MLM
Last annual - 1/12/2021  Last pap - 1/12/2021, normal  Last mammo - 9/30/2020, negative    Annual scheduled 5/24/2022. Message to CAP. Last mammo on 2020. Ellie Morales for mammo order before annual exam appt on 5/24/2022? - - -

## 2023-07-12 NOTE — ED PROVIDER NOTE - PHYSICAL EXAMINATION
Gen: well appearing, NAD  HEENT: no stridor, mucous membranes moist  CV: regular rate and rhythm  Lungs: no resp distress, nml effort  Abd: nondistended, flat, epigastric tenderness to palpation   Ext: no gross deformities, no peripheral edema  Neuro: A&Ox3, moving all extremities symmetrically, nml gait  Skin: no rash, no pallor  Psych: not responding to internal stimuli, appropriate affect

## 2023-07-12 NOTE — ED PROVIDER NOTE - NSFOLLOWUPINSTRUCTIONS_ED_ALL_ED_FT
You were treated for alcohol withdrawal- alcohol withdrawal is very dangerous and can cause seizures and death. If you do not leave and drink alcohol then you should immediately present to a detox unit or return to ER if you develop severe HA, nausea, vomiting, shaking or tremors, hallucinations or dark/black stool.

## 2023-07-12 NOTE — ED PROVIDER NOTE - PHYSICAL EXAMINATION
Pt mildly unkempt, tremulous but overall not toxic or ill appearing, intermittently retches  VSS with rectal temp 100.4  +tongue fasisculations  +b/l hand tremor w/ arm extension  Lungs cta b/l  Heart rrr  Abd soft with +epigastric tenderness, no cva ttp  Rectal exam performed with chaperone KANDY Mg s/f light brown stool, no brbpr or melena, no hemorrhoids visualized or palpated, no masses palpated

## 2023-07-12 NOTE — ED PROVIDER NOTE - CLINICAL SUMMARY MEDICAL DECISION MAKING FREE TEXT BOX
A/P: Pt. with PMHx of ETOH abuse presenting to the E.D. for heartburn  --Not concerned for serious GI pathology such as pancreatitis, biliary disease at this time  --Will treat with GI cocktail  --Patient advised to try and cut back on alcohol consumption

## 2023-07-12 NOTE — ED ADULT NURSE NOTE - NSFALLRISKINTERV_ED_ALL_ED
bed alarm placed/Assistance OOB with selected safe patient handling equipment if applicable/Assistance with ambulation/Communicate fall risk and risk factors to all staff, patient, and family/Monitor gait and stability/Monitor for mental status changes and reorient to person, place, and time, as needed/Provide visual cue: yellow wristband, yellow gown, etc/Reinforce activity limits and safety measures with patient and family/Toileting schedule using arm’s reach rule for commode and bathroom/Use of alarms - bed, stretcher, chair and/or video monitoring/Call bell, personal items and telephone in reach/Instruct patient to call for assistance before getting out of bed/chair/stretcher/Non-slip footwear applied when patient is off stretcher/[place] to call system/Physically safe environment - no spills, clutter or unnecessary equipment/Purposeful Proactive Rounding/Room/bathroom lighting operational, light cord in reach

## 2023-07-12 NOTE — ED PROVIDER NOTE - OBJECTIVE STATEMENT
33-year-old male with past medical history of alcohol use disorder (history of withdrawal seizures) presents complaining of withdrawal and severe reflux x1 day.  Patient was seen earlier this morning for severe reflux, treated with GI cocktail which improved symptoms but returned complaining of persistent reflux and onset of withdrawal.  Patient reports his last drink was around 11 PM last night, usually drinks 12 beers and 12 liquor drinks per day.  Currently patient endorses tremors, anxiety, nausea and had one episode of dark emesis this morning.  Patient also reports having dark stool for last few days.  Pt walked in c/o etoh withdrawal, tremors, blood tinged emesis, and LLQ abdominal pain x today. Pt reports his last drink was last night at 2330. Reports history of eoth withdrawal seizures. Sepsis called in triage.  multiple medical complaints    HPI Objective Statement: 33-year old M with PMHx of ETOH abuse presenting to the E.D. complaining of epigastric pain, states it is burning, radiating up into his chest and throat, worse with drinking.  States he has been told in the past to cut back on drinking to relieve heartburn, but patient states he is a chronic alcoholic and cannot stop drinking or he will suffer a seizure.  Cannot afford OTC heartburn medications.  States he feels gassy and is burping often.  Denies nausea, vomiting. 33-year-old male with past medical history of alcohol use disorder (history of withdrawal seizures) presents complaining of withdrawal and severe reflux x1 day.  Patient was seen earlier this morning for GERD/reflux, treated with GI cocktail which improved symptoms but returned complaining of persistent reflux and onset of withdrawal.  Patient reports his last drink was around 11 PM last night, usually drinks 12 beers and 12 liquor drinks per day.  Currently patient endorses tremors, anxiety, nausea and had one episode of light brown emesis this morning; denies hematemesis.  Patient also reports having dark stool for last few days, no brpbr. Pt reports he is wants to stop drinking, thinks it is killing himself, notes hx of sobriety x 7 years. Pt also reports intermittent cocaine use, last use 1 week ago, no other drug use. Denies fever/chills, HA, neck or back pain, dizziness, syncope, cough, cp, sob, urinary ssx, leg swelling, hallucinations.

## 2023-07-12 NOTE — ED PROVIDER NOTE - MDM ORDERS SUBMITTED SELECTION
Diagnosis Orders   1.  Nausea  ondansetron (ZOFRAN) 4 MG tablet
From: Mane Weems  To: Dr. Mike Patel: Maxime Whaley. Thank you
Please send to correct pharmacy.
Labs/EKG/Imaging Studies/Medications

## 2023-07-12 NOTE — ED PROVIDER NOTE - PROGRESS NOTE DETAILS
CIWA 0 after librium and valium, feels much improved  W/u notable for wbc 5.6, hgb 16.6/47.3, INR wnl, bmp wnl, mildly elevated lft, trop 4.3, lipase 59, alcohol 10, HIV negative  CXR wnl, CTAP with hepatomegaly w/o acute findings Repeat cbc with hgb 15.0 likely 2/2 dilution, FOB negative and no melena on exam, unlikely GIB but strict return precautions given  CIWA remains 0, spoke with Nelda Mcqueen with [facility] and given informed of inpatient detox options, pt planning to go there  VSS, afebrile- no infectious source soon found but no leukocytosis or left shift, UA not obtained but pt w/o urinary ssx and wnl CTAP  Ambulating with steady gait  Discussed all results obtained at time of discharge with patient. Pt agrees with plan for discharge and further evaluation with outpatient follow up. Strict return precautions given, patient verbalized understanding and all questions answered. Pt currently stable for discharge.  Discussed case with MD Ellison and melissa for discharge at this time

## 2023-07-12 NOTE — ED PROVIDER NOTE - CLINICAL SUMMARY MEDICAL DECISION MAKING FREE TEXT BOX
33-year-old male with past medical history of alcohol use disorder (history of withdrawal seizures) presents complaining of withdrawal and severe reflux x1 day.  Pt seen this AM for reflux > improved with tx > returns c/o persistent gerd and now withdrawal. Last drink at 11 PM, drinks 24 beer/liquor per day. C/o tremors, nausea with 1 episode of light brown vomiting, dark stool last few days. Exam s/f VSS with T 100.4, tremulous, tongue fasisculations and hand tremors, abd with epigastric ttp. Concern for alcohol withdrawal, wants to detox- give librium 50 mg PO and valium 5 mg IV (ciwa 6), c/w ciwa, give NS and thiamine. Concern for fever with persistent abd pain which may be related to alcoholic gastritis but given return visit- will also assess for PUD > GIB, pancreatitis, kendrick, less likely appy, diverticulitis. Plan for labs with cultures, ekg, CXR, UA, CTAP w/ contrast and likely admit. 33-year-old male with past medical history of alcohol use disorder (history of withdrawal seizures) presents complaining of withdrawal and severe reflux x1 day.  Pt seen this AM for reflux > improved with tx > returns c/o persistent gerd and now withdrawal. Last drink at 11 PM, drinks 24 beer/liquor per day. C/o tremors, nausea with 1 episode of light brown vomiting, dark stool last few days. Exam s/f VSS with T 100.4, tremulous, tongue fasciculations, and hand tremors, abd with epigastric ttp. Concern for alcohol withdrawal, wants to detox- give librium 50 mg PO and valium 5 mg IV (ciwa 6), c/w ciwa, give NS and thiamine. Concern for fever with persistent abd pain which may be related to alcoholic gastritis but given return visit- will also assess for PUD > GIB, pancreatitis, kendrick, less likely appy, diverticulitis. Plan for labs with cultures, ekg, CXR, UA, CTAP w/ contrast and likely admit.

## 2023-07-12 NOTE — [facility] NOTE ADULT - NSSBIRTBRIEFINTDET_GEN_A_CORE
Services provided via [employer]. Screening results were reviewed with the patient and patient was provided information about healthy guidelines and potential negative consequences associated with level of risk. Motivation and readiness to reduce or stop use was discussed and goals and activities to make changes were suggested/offered. Patient is interested in detox, HC will speak with provider re:hospital admission due to patient's medical history. Additionally, patient is requesting rehab referral. HC will continue to work with patient during hospital stay to facilitate rehab referral.

## 2023-07-12 NOTE — ED ADULT NURSE NOTE - OBJECTIVE STATEMENT
Pt is a 33y male c/o heart burn. Pt states he is a daily drinker and pain is associated with that. States he has been burping often. denies N/V

## 2023-07-12 NOTE — ED PROVIDER NOTE - OBJECTIVE STATEMENT
33-year old M with PMHx of ETOH abuse presenting to the E.D. complaining of epigastric pain, states it is burning, radiating up into his chest and throat, worse with drinking.  States he has been told in the past to cut back on drinking to relieve heartburn, but patient states he is a chronic alcoholic and cannot stop drinking or he will suffer a seizure.  Cannot afford OTC heartburn medications.  States he feels gassy and is burping often.  Denies nausea, vomiting.

## 2023-07-12 NOTE — ED PROVIDER NOTE - PATIENT PORTAL LINK FT
You can access the FollowMyHealth Patient Portal offered by [facility] by registering at the following website: [email or web address]. By joining [employer]’s FollowMyHealth portal, you will also be able to view your health information using other applications (apps) compatible with our system.

## 2023-07-12 NOTE — ED ADULT NURSE NOTE - OBJECTIVE STATEMENT
PT complaining of alcohol withdraw. Pt states that he has been drinking everyday 20 drinks a day. Last drink last night at 11:30. Pt complaining of ingestion and left sided abd pain. Pt states that he has 1 episode of "brown bloody" vomit.  PT with history of withdraw seizures in the past. Denies diarrhea. PT complaining of alcohol withdraw. Pt states that he has been drinking everyday 20 drinks a day over the past month. Last drink last night at 11:30. Pt complaining of ingestion and left sided abd pain. Pt states that he has 1 episode of "brown bloody" vomit.  PT with history of withdraw seizures in the past. Denies diarrhea.

## 2023-07-12 NOTE — ED ADULT NURSE NOTE - TEMPLATE
----- Message from Mark  Jason sent at 8/11/2021  9:24 AM EDT -----  Regarding: Non-Urgent Medical Question  Contact: [phone]  Jim Johnio Jesús,   I am employed by the network as an  and, like I'm assuming you did, received the recent email from [employer] concerning the mandatory vaccine  Quite literally, on the same day I received the email, my  and I had decided we'd like to start trying for a baby (yay!)  I guess my main question is, do you think I should get the vaccine while we are trying to conceive? Is it safe? Will it hinder our chances of getting pregnant? I had previously not gotten the vaccine because my  and I both had COVID in early April of this year  I really haven't done my own research yet, but I wanted to ask you now given the short timeframe we have to get fully vaccinated  Any guidance is greatly appreciated - and if I need to make an appointment to talk about this, I'm more than willing!     Thank you,   Alhaji West
Please advise that we do recommend receiving the vaccine at any time - while TTC or while pregnant are both considered safe  The risk of severe covid-19 disease, risk of needing ventilator support and risk of death is all significantly higher in unvaccinated pregnant women, thus the benefits of being vaccinated far outweigh any risks  She may review ACOG website for additional info if desired 
Abdominal Pain, N/V/D

## 2023-07-12 NOTE — ED PROVIDER NOTE - NS ED ATTENDING STATEMENT MOD
This was a shared visit with the ZULAY. I reviewed and verified the documentation and independently performed the documented:

## 2023-07-12 NOTE — ED ADULT TRIAGE NOTE - CHIEF COMPLAINT QUOTE
Pt walked into ER requesting medication for his acid reflux. Pt reports being seen at another ER today for same. Pt denies further at triage.

## 2023-07-12 NOTE — ED PROVIDER NOTE - EYES, MLM
Kenn/Clinda/Followed protocol Clear bilaterally, pupils equal, round and reactive to light. no scleral icterus

## 2023-07-12 NOTE — ED ADULT NURSE REASSESSMENT NOTE - NS ED NURSE REASSESS COMMENT FT1
Pt care handed over to myself, introduced self to patient, gcs 15 , side rails x2 in upright position, iv fluids gone through, pt c/o hearbturn, ruben farley aware, awaiting further review
Pt gcs 15 feeling much better post meds, hot food and drink given
repeat cbc sent to lab, pt gcs 15, feeling "good: after eating is aware he is likely to be dc post lab results
patient received from previous shift. Patient reevaluated by MD; cleared for discharge. Patient alert verbal oriented x3; ambulatory w/ steady gait. Left ED safely without complaint. Discharge paperwork provided.

## 2023-07-12 NOTE — ED ADULT NURSE NOTE - NS ED NURSE LEVEL OF CONSCIOUSNESS ORIENTATION
Oriented - self; Oriented - place; Oriented - time Mucosal Advancement Flap Text: Given the location of the defect, shape of the defect and the proximity to free margins a mucosal advancement flap was deemed most appropriate. Incisions were made with a 15 blade scalpel in the appropriate fashion along the cutaneous vermilion border and the mucosal lip. The remaining actinically damaged mucosal tissue was excised.  The mucosal advancement flap was then elevated to the gingival sulcus with care taken to preserve the neurovascular structures and advanced into the primary defect. Care was taken to ensure that precise realignment of the vermilion border was achieved.

## 2023-07-13 DIAGNOSIS — K29.20 ALCOHOLIC GASTRITIS WITHOUT BLEEDING: ICD-10-CM

## 2023-07-13 DIAGNOSIS — F10.10 ALCOHOL ABUSE, UNCOMPLICATED: ICD-10-CM

## 2023-07-13 DIAGNOSIS — R10.13 EPIGASTRIC PAIN: ICD-10-CM

## 2023-07-13 DIAGNOSIS — F17.200 NICOTINE DEPENDENCE, UNSPECIFIED, UNCOMPLICATED: ICD-10-CM

## 2023-07-18 LAB
CULTURE RESULTS: SIGNIFICANT CHANGE UP
CULTURE RESULTS: SIGNIFICANT CHANGE UP
SPECIMEN SOURCE: SIGNIFICANT CHANGE UP
SPECIMEN SOURCE: SIGNIFICANT CHANGE UP

## 2023-08-16 ENCOUNTER — HOSPITAL ENCOUNTER (INPATIENT)
Dept: HOSPITAL 74 - YASAS | Age: 33
LOS: 1 days | Discharge: LEFT BEFORE DISCHARGE | DRG: 770 | End: 2023-08-17
Attending: ALLERGY & IMMUNOLOGY | Admitting: ALLERGY & IMMUNOLOGY
Payer: COMMERCIAL

## 2023-08-16 VITALS — BODY MASS INDEX: 25.3 KG/M2

## 2023-08-16 DIAGNOSIS — F17.210: ICD-10-CM

## 2023-08-16 DIAGNOSIS — F10.230: Primary | ICD-10-CM

## 2023-08-16 DIAGNOSIS — F14.20: ICD-10-CM

## 2023-08-16 PROCEDURE — HZ2ZZZZ DETOXIFICATION SERVICES FOR SUBSTANCE ABUSE TREATMENT: ICD-10-PCS | Performed by: ALLERGY & IMMUNOLOGY

## 2023-08-16 RX ADMIN — IBUPROFEN PRN MG: 600 TABLET, FILM COATED ORAL at 18:20

## 2023-08-16 RX ADMIN — LEVETIRACETAM SCH MG: 500 TABLET, FILM COATED ORAL at 22:30

## 2023-08-17 VITALS
DIASTOLIC BLOOD PRESSURE: 64 MMHG | TEMPERATURE: 97.6 F | RESPIRATION RATE: 20 BRPM | HEART RATE: 84 BPM | SYSTOLIC BLOOD PRESSURE: 105 MMHG

## 2023-08-17 LAB
ALBUMIN SERPL-MCNC: 3.4 G/DL (ref 3.4–5)
ALP SERPL-CCNC: 96 U/L (ref 45–117)
ALT SERPL-CCNC: 27 U/L (ref 13–61)
ANION GAP SERPL CALC-SCNC: 6 MMOL/L (ref 8–16)
AST SERPL-CCNC: 25 U/L (ref 15–37)
BILIRUB SERPL-MCNC: 1.2 MG/DL (ref 0.2–1)
BUN SERPL-MCNC: 8.3 MG/DL (ref 7–18)
CALCIUM SERPL-MCNC: 8.6 MG/DL (ref 8.5–10.1)
CHLORIDE SERPL-SCNC: 100 MMOL/L (ref 98–107)
CO2 SERPL-SCNC: 32 MMOL/L (ref 21–32)
CREAT SERPL-MCNC: 0.6 MG/DL (ref 0.55–1.3)
DEPRECATED RDW RBC AUTO: 14.7 % (ref 11.9–15.9)
GLUCOSE SERPL-MCNC: 95 MG/DL (ref 74–106)
HCT VFR BLD CALC: 43.6 % (ref 35.4–49)
HGB BLD-MCNC: 14.5 GM/DL (ref 11.7–16.9)
MCH RBC QN AUTO: 32.9 PG (ref 25.7–33.7)
MCHC RBC AUTO-ENTMCNC: 33.4 G/DL (ref 32–35.9)
MCV RBC: 98.7 FL (ref 80–96)
PLATELET # BLD AUTO: 229 10^3/UL (ref 134–434)
PMV BLD: 8.8 FL (ref 7.5–11.1)
POTASSIUM SERPLBLD-SCNC: 3.5 MMOL/L (ref 3.5–5.1)
PROT SERPL-MCNC: 6.5 G/DL (ref 6.4–8.2)
RBC # BLD AUTO: 4.42 M/MM3 (ref 4–5.6)
SODIUM SERPL-SCNC: 139 MMOL/L (ref 136–145)
WBC # BLD AUTO: 12.1 K/MM3 (ref 4–10)

## 2023-08-17 RX ADMIN — LEVETIRACETAM SCH MG: 500 TABLET, FILM COATED ORAL at 10:26

## 2023-08-17 RX ADMIN — IBUPROFEN PRN MG: 600 TABLET, FILM COATED ORAL at 05:30

## 2023-08-17 RX ADMIN — IBUPROFEN PRN MG: 600 TABLET, FILM COATED ORAL at 17:29

## 2023-08-18 ENCOUNTER — EMERGENCY (EMERGENCY)
Facility: HOSPITAL | Age: 33
LOS: 1 days | Discharge: HOME | End: 2023-08-18
Attending: EMERGENCY MEDICINE | Admitting: EMERGENCY MEDICINE
Payer: MEDICAID

## 2023-08-18 ENCOUNTER — EMERGENCY (EMERGENCY)
Facility: HOSPITAL | Age: 33
LOS: 1 days | Discharge: LEFT BEFORE DISCHARGE | End: 2023-08-18
Admitting: EMERGENCY MEDICINE
Payer: MEDICAID

## 2023-08-18 VITALS
WEIGHT: 179.9 LBS | RESPIRATION RATE: 18 BRPM | HEART RATE: 100 BPM | DIASTOLIC BLOOD PRESSURE: 80 MMHG | OXYGEN SATURATION: 97 % | TEMPERATURE: 98 F | HEIGHT: 73 IN | SYSTOLIC BLOOD PRESSURE: 128 MMHG

## 2023-08-18 VITALS
DIASTOLIC BLOOD PRESSURE: 73 MMHG | HEART RATE: 67 BPM | SYSTOLIC BLOOD PRESSURE: 183 MMHG | OXYGEN SATURATION: 98 % | TEMPERATURE: 98 F | RESPIRATION RATE: 17 BRPM

## 2023-08-18 VITALS
HEART RATE: 102 BPM | WEIGHT: 179.9 LBS | SYSTOLIC BLOOD PRESSURE: 116 MMHG | HEIGHT: 73 IN | TEMPERATURE: 98 F | DIASTOLIC BLOOD PRESSURE: 81 MMHG | OXYGEN SATURATION: 99 % | RESPIRATION RATE: 18 BRPM

## 2023-08-18 LAB
ALBUMIN SERPL ELPH-MCNC: 3.5 G/DL — SIGNIFICANT CHANGE UP (ref 3.4–5)
ALP SERPL-CCNC: 98 U/L — SIGNIFICANT CHANGE UP (ref 40–120)
ALT FLD-CCNC: 27 U/L — SIGNIFICANT CHANGE UP (ref 12–42)
ANION GAP SERPL CALC-SCNC: 8 MMOL/L — LOW (ref 9–16)
AST SERPL-CCNC: 49 U/L — HIGH (ref 15–37)
BILIRUB SERPL-MCNC: 0.4 MG/DL — SIGNIFICANT CHANGE UP (ref 0.2–1.2)
BUN SERPL-MCNC: 5 MG/DL — LOW (ref 7–23)
CALCIUM SERPL-MCNC: 8.3 MG/DL — LOW (ref 8.5–10.5)
CHLORIDE SERPL-SCNC: 98 MMOL/L — SIGNIFICANT CHANGE UP (ref 96–108)
CO2 SERPL-SCNC: 31 MMOL/L — SIGNIFICANT CHANGE UP (ref 22–31)
CREAT SERPL-MCNC: 0.52 MG/DL — SIGNIFICANT CHANGE UP (ref 0.5–1.3)
EGFR: 136 ML/MIN/1.73M2 — SIGNIFICANT CHANGE UP
ETHANOL SERPL-MCNC: 285 MG/DL — HIGH
GLUCOSE BLDC GLUCOMTR-MCNC: 97 MG/DL — SIGNIFICANT CHANGE UP (ref 70–99)
GLUCOSE SERPL-MCNC: 89 MG/DL — SIGNIFICANT CHANGE UP (ref 70–99)
HCT VFR BLD CALC: 43.3 % — SIGNIFICANT CHANGE UP (ref 39–50)
HGB BLD-MCNC: 15.4 G/DL — SIGNIFICANT CHANGE UP (ref 13–17)
MAGNESIUM SERPL-MCNC: 2 MG/DL — SIGNIFICANT CHANGE UP (ref 1.6–2.6)
MCHC RBC-ENTMCNC: 34.2 PG — HIGH (ref 27–34)
MCHC RBC-ENTMCNC: 35.6 GM/DL — SIGNIFICANT CHANGE UP (ref 32–36)
MCV RBC AUTO: 96.2 FL — SIGNIFICANT CHANGE UP (ref 80–100)
NRBC # BLD: 0 /100 WBCS — SIGNIFICANT CHANGE UP (ref 0–0)
PLATELET # BLD AUTO: 282 K/UL — SIGNIFICANT CHANGE UP (ref 150–400)
POTASSIUM SERPL-MCNC: 5.2 MMOL/L — SIGNIFICANT CHANGE UP (ref 3.5–5.3)
POTASSIUM SERPL-SCNC: 5.2 MMOL/L — SIGNIFICANT CHANGE UP (ref 3.5–5.3)
PROT SERPL-MCNC: 8.2 G/DL — SIGNIFICANT CHANGE UP (ref 6.4–8.2)
RBC # BLD: 4.5 M/UL — SIGNIFICANT CHANGE UP (ref 4.2–5.8)
RBC # FLD: 13.1 % — SIGNIFICANT CHANGE UP (ref 10.3–14.5)
SODIUM SERPL-SCNC: 137 MMOL/L — SIGNIFICANT CHANGE UP (ref 132–145)
WBC # BLD: 14.91 K/UL — HIGH (ref 3.8–10.5)
WBC # FLD AUTO: 14.91 K/UL — HIGH (ref 3.8–10.5)

## 2023-08-18 PROCEDURE — 70450 CT HEAD/BRAIN W/O DYE: CPT | Mod: 26

## 2023-08-18 PROCEDURE — L9991: CPT

## 2023-08-18 PROCEDURE — 99223 1ST HOSP IP/OBS HIGH 75: CPT

## 2023-08-18 PROCEDURE — 82962 GLUCOSE BLOOD TEST: CPT

## 2023-08-18 RX ORDER — ONDANSETRON 8 MG/1
4 TABLET, FILM COATED ORAL ONCE
Refills: 0 | Status: COMPLETED | OUTPATIENT
Start: 2023-08-18 | End: 2023-08-18

## 2023-08-18 RX ORDER — MIDAZOLAM HYDROCHLORIDE 1 MG/ML
3 INJECTION, SOLUTION INTRAMUSCULAR; INTRAVENOUS ONCE
Refills: 0 | Status: DISCONTINUED | OUTPATIENT
Start: 2023-08-18 | End: 2023-08-18

## 2023-08-18 RX ORDER — SODIUM CHLORIDE 9 MG/ML
1000 INJECTION INTRAMUSCULAR; INTRAVENOUS; SUBCUTANEOUS ONCE
Refills: 0 | Status: COMPLETED | OUTPATIENT
Start: 2023-08-18 | End: 2023-08-18

## 2023-08-18 RX ORDER — HALOPERIDOL DECANOATE 100 MG/ML
2.5 INJECTION INTRAMUSCULAR ONCE
Refills: 0 | Status: COMPLETED | OUTPATIENT
Start: 2023-08-18 | End: 2023-08-18

## 2023-08-18 RX ORDER — SODIUM CHLORIDE 9 MG/ML
1000 INJECTION INTRAMUSCULAR; INTRAVENOUS; SUBCUTANEOUS ONCE
Refills: 0 | Status: DISCONTINUED | OUTPATIENT
Start: 2023-08-18 | End: 2023-08-18

## 2023-08-18 RX ADMIN — MIDAZOLAM HYDROCHLORIDE 3 MILLIGRAM(S): 1 INJECTION, SOLUTION INTRAMUSCULAR; INTRAVENOUS at 11:27

## 2023-08-18 RX ADMIN — MIDAZOLAM HYDROCHLORIDE 3 MILLIGRAM(S): 1 INJECTION, SOLUTION INTRAMUSCULAR; INTRAVENOUS at 10:52

## 2023-08-18 RX ADMIN — SODIUM CHLORIDE 1000 MILLILITER(S): 9 INJECTION INTRAMUSCULAR; INTRAVENOUS; SUBCUTANEOUS at 11:00

## 2023-08-18 RX ADMIN — ONDANSETRON 4 MILLIGRAM(S): 8 TABLET, FILM COATED ORAL at 10:15

## 2023-08-18 RX ADMIN — HALOPERIDOL DECANOATE 2.5 MILLIGRAM(S): 100 INJECTION INTRAMUSCULAR at 10:52

## 2023-08-18 NOTE — ED ADULT NURSE NOTE - NSFALLRISKINTERV_ED_ALL_ED
Assistance OOB with selected safe patient handling equipment if applicable/Assistance with ambulation/Communicate fall risk and risk factors to all staff, patient, and family/Monitor gait and stability/Monitor for mental status changes and reorient to person, place, and time, as needed/Move patient closer to nursing station/within visual sight of ED staff/Provide visual cue: yellow wristband, yellow gown, etc/Reinforce activity limits and safety measures with patient and family/Toileting schedule using arm’s reach rule for commode and bathroom/Use of alarms - bed, stretcher, chair and/or video monitoring/Call bell, personal items and telephone in reach/Instruct patient to call for assistance before getting out of bed/chair/stretcher/Non-slip footwear applied when patient is off stretcher/[place] to call system/Physically safe environment - no spills, clutter or unnecessary equipment/Purposeful Proactive Rounding/Room/bathroom lighting operational, light cord in reach

## 2023-08-18 NOTE — ED PROVIDER NOTE - PHYSICAL EXAMINATION
Gen - AOB, unkempt, slurred speech, uncooperative   Skin - warm, abrasions and ecchymosis in different stages of healing on both arms and legs.  R parietal scalp healing injury with staples in place.    HEENT -airway patent  Resp - Speaking in uninterrupted sentences.    GI - No distention or tenderness  MS - No deformities  Neuro - Uncooperative, moves all 4 extremities.

## 2023-08-18 NOTE — ED ADULT TRIAGE NOTE - MEANS OF ARRIVAL
Acknowledgement of Current Treatment Plan - Initial Treatment Plan     INITIAL TREATMENT PLAN:     1. I have participated in creating my treatment plan with my therapist / counselor on __________.     I agree with the plan as it is written in the electronic health record.    Name Signature/Date   Patient     Name of Therapist / Counselor Signature/Date   Counselor/Therapist        2. I have completed and reviewed my Safety Plan with my counselor and signed this on _________. I have been given the hard copy of this plan.    Patient signature/date:      _____________________________________________________________________________    3. Last Use Date: __________    Patient signature/date:     _____________________________________________________________________________                    
stretcher

## 2023-08-18 NOTE — ED ADULT NURSE REASSESSMENT NOTE - NS ED NURSE REASSESS COMMENT FT1
Pt attempting to get up by himself and physically aggressive with staff, security called to bedside. MD at bedside to reassess.

## 2023-08-18 NOTE — ED PROVIDER NOTE - CLINICAL SUMMARY MEDICAL DECISION MAKING FREE TEXT BOX
Labs and imaging reviewed.  Multiple attempts at de-escalation and chemical sedation.  Place on observation for neurochecks, fall precautions.

## 2023-08-18 NOTE — ED PROVIDER NOTE - WET READ LAUNCH FT
There are no Wet Read(s) to document. Xeltonyz Pregnancy And Lactation Text: This medication is Pregnancy Category D and is not considered safe during pregnancy.  The risk during breast feeding is also uncertain.

## 2023-08-18 NOTE — ED ADULT TRIAGE NOTE - CHIEF COMPLAINT QUOTE
here for ams- admits to drinking alcohol- pt with old abrasion to side of head and dried blood in mouth- pt is uncooperative and was escorted into ED with AYAN

## 2023-08-18 NOTE — ED ADULT NURSE NOTE - OBJECTIVE STATEMENT
Pt arrives for ams, admits to drinking alcohol. Pt uncooperative, repeating questions, and jumping up from stretcher on presentation, brought in with [employer]. MD at bedside, medicated per MAR.

## 2023-08-18 NOTE — ED CDU PROVIDER DISPOSITION NOTE - CLINICAL COURSE
pt signed out to metabolize alcohol and prior sedation. pt now alert, and walking without assistance. will dc

## 2023-08-18 NOTE — ED ADULT NURSE REASSESSMENT NOTE - NS ED NURSE REASSESS COMMENT FT1
pt is awake, asked for ice water, calm and cooperative. still can't remember what happened last night except that he was drinking.

## 2023-08-18 NOTE — ED PROVIDER NOTE - OBJECTIVE STATEMENT
33-year-old male presents with EMS and [facility] escort for alcohol intoxication and combative behavior as per EMS [place] large amount of beer cans and endorse drinking alcohol EMS endorses 2 episodes of vomitus with red color patient is a poor historian and uncooperative history of remote injuries on the head and arms denies medication use or past medical history states "I drink beer every day"

## 2023-08-18 NOTE — ED CDU PROVIDER DISPOSITION NOTE - PATIENT PORTAL LINK FT
You can access the FollowMyHealth Patient Portal offered by [facility] by registering at the following website: [email or web address]. By joining [facility]’s FollowMyHealth portal, you will also be able to view your health information using other applications (apps) compatible with our system.

## 2023-08-18 NOTE — ED PROVIDER NOTE - PROGRESS NOTE DETAILS
labs and imaging reviewed.  .  Patient continues to be uncooperative, despite redirection.  Not able to articulate a safe discharge plan.  Additional sedation ordered.

## 2023-08-18 NOTE — ED CDU PROVIDER INITIAL DAY NOTE - OBJECTIVE STATEMENT
33-year-old male presents with EMS and [facility] escort for alcohol intoxication and combative behavior as per EMS [place] large amount of beer cans and endorse drinking alcohol EMS endorses 2 episodes of vomitus with red color patient is a poor historian and uncooperative history of remote injuries on the head and arms denies medication use or past medical history states "I drink beer every day"

## 2023-08-21 DIAGNOSIS — F10.129 ALCOHOL ABUSE WITH INTOXICATION, UNSPECIFIED: ICD-10-CM
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
## 2023-08-21 DIAGNOSIS — R41.82 ALTERED MENTAL STATUS, UNSPECIFIED: ICD-10-CM

## 2023-08-21 DIAGNOSIS — Z53.21 PROCEDURE AND TREATMENT NOT CARRIED OUT DUE TO PATIENT LEAVING PRIOR TO BEING SEEN BY HEALTH CARE PROVIDER: ICD-10-CM

## 2023-08-22 ENCOUNTER — EMERGENCY (EMERGENCY)
Facility: HOSPITAL | Age: 33
LOS: 1 days | Discharge: HOME | End: 2023-08-22
Attending: EMERGENCY MEDICINE | Admitting: EMERGENCY MEDICINE
Payer: MEDICAID

## 2023-08-22 VITALS
DIASTOLIC BLOOD PRESSURE: 82 MMHG | HEART RATE: 80 BPM | OXYGEN SATURATION: 96 % | TEMPERATURE: 99 F | WEIGHT: 190.04 LBS | RESPIRATION RATE: 18 BRPM | HEIGHT: 73 IN | SYSTOLIC BLOOD PRESSURE: 119 MMHG

## 2023-08-22 VITALS
RESPIRATION RATE: 16 BRPM | OXYGEN SATURATION: 97 % | SYSTOLIC BLOOD PRESSURE: 125 MMHG | HEART RATE: 80 BPM | DIASTOLIC BLOOD PRESSURE: 76 MMHG
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
## 2023-08-22 DIAGNOSIS — R50.9 FEVER, UNSPECIFIED: ICD-10-CM
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
## 2023-08-22 DIAGNOSIS — R07.1 CHEST PAIN ON BREATHING: ICD-10-CM

## 2023-08-22 DIAGNOSIS — Z20.822 CONTACT WITH AND (SUSPECTED) EXPOSURE TO COVID-19: ICD-10-CM

## 2023-08-22 DIAGNOSIS — R05.9 COUGH, UNSPECIFIED: ICD-10-CM

## 2023-08-22 DIAGNOSIS — Z98.890 OTHER SPECIFIED POSTPROCEDURAL STATES: Chronic | ICD-10-CM

## 2023-08-22 DIAGNOSIS — F17.200 NICOTINE DEPENDENCE, UNSPECIFIED, UNCOMPLICATED: ICD-10-CM

## 2023-08-22 DIAGNOSIS — M25.561 PAIN IN RIGHT KNEE: ICD-10-CM

## 2023-08-22 DIAGNOSIS — R00.0 TACHYCARDIA, UNSPECIFIED: ICD-10-CM

## 2023-08-22 LAB — SARS-COV-2 RNA SPEC QL NAA+PROBE: NEGATIVE — SIGNIFICANT CHANGE UP

## 2023-08-22 PROCEDURE — 73562 X-RAY EXAM OF KNEE 3: CPT | Mod: 26,RT

## 2023-08-22 PROCEDURE — 99285 EMERGENCY DEPT VISIT HI MDM: CPT | Mod: 25

## 2023-08-22 PROCEDURE — 99284 EMERGENCY DEPT VISIT MOD MDM: CPT | Mod: 25

## 2023-08-22 PROCEDURE — 71046 X-RAY EXAM CHEST 2 VIEWS: CPT | Mod: 26

## 2023-08-22 PROCEDURE — 87635 SARS-COV-2 COVID-19 AMP PRB: CPT

## 2023-08-22 PROCEDURE — 93005 ELECTROCARDIOGRAM TRACING: CPT

## 2023-08-22 PROCEDURE — 71046 X-RAY EXAM CHEST 2 VIEWS: CPT

## 2023-08-22 PROCEDURE — 73562 X-RAY EXAM OF KNEE 3: CPT

## 2023-08-22 RX ORDER — IBUPROFEN 200 MG
600 TABLET ORAL ONCE
Refills: 0 | Status: COMPLETED | OUTPATIENT
Start: 2023-08-22 | End: 2023-08-22

## 2023-08-22 RX ORDER — IBUPROFEN 200 MG
1 TABLET ORAL
Qty: 30 | Refills: 0
Start: 2023-08-22

## 2023-08-22 RX ADMIN — Medication 600 MILLIGRAM(S): at 08:41

## 2023-08-22 RX ADMIN — Medication 50 MILLIGRAM(S): at 08:41

## 2023-08-22 RX ADMIN — Medication 100 MILLIGRAM(S): at 08:41

## 2023-08-22 NOTE — ED ADULT NURSE NOTE - OBJECTIVE STATEMENT
32 yo M presents to the ED co cough, L sided CP, L back of knee pain since last night. Pt awake and alert, AOx4. Pt reports he has a sharp chest pain when he takes a deep breath. Pt reports he also has L posterior knee pain s/p "missing a stair" the other day. Pt denies falling. Pt reports he drinks about 15 tall boys a day at a minimum. Pt reports his last drink was at 2AM. Pt reports cocaine use last week. No swelling, redness, or warmth noted to L leg. Pt denies SOB, N/V/D, ha, vision changes, numbness, tingling, dizziness, lightheadedness.

## 2023-08-22 NOTE — ED PROVIDER NOTE - PATIENT PORTAL LINK FT
You can access the FollowMyHealth Patient Portal offered by [facility] by registering at the following website: [email or web address]. By joining [employer]’s FollowMyHealth portal, you will also be able to view your health information using other applications (apps) compatible with our system.

## 2023-08-22 NOTE — ED PROVIDER NOTE - OBJECTIVE STATEMENT
33-year-old male with history of etoh abuse complains of 1.  Pleuritic chest pain with coughing for the past 2 days.  Patient denies other URI symptoms, fever, chills, chest pain other than with cough.  Patient denies shortness of breath.  No sick contacts, no travel.  No calf pain or swelling.  2.  Right knee pain-patient reports that he twisted his knee yesterday and has had pain on the medial and posterior aspect of his knee since that time, worse when weightbearing.  No swelling, redness.  No history of prior knee trauma.  No associated numbness or weakness above his baseline weakness due to spine surgery years ago.  No other joint pain.

## 2023-08-22 NOTE — ED PROVIDER NOTE - PHYSICAL EXAMINATION
VITAL SIGNS: I have reviewed nursing notes and confirm.  CONSTITUTIONAL:  in no acute distress.   SKIN:  warm and dry, no acute rash.   HEAD:  normocephalic, atraumatic.  EYES: PERRL, EOM intact; conjunctiva and sclera clear.  ENT: No nasal discharge; airway clear.   NECK: Supple; non tender.  CARD: S1, S2 normal; no murmurs, gallops, or rubs. Regular rate and rhythm.   RESP:  Clear to auscultation b/l, no wheezes, rales or rhonchi.  ABD: Normal bowel sounds; soft; non-distended; non-tender; no guarding/ rebound.  MSK: Normal ROM. No clubbing, cyanosis or edema. no calf ttp bilat le RLE - + ttp medial jt line and popliteal area w/o swelling, effusion, no ant/post drawer, no sag sign, no med/lat laxity, dp 1+, no other bony ttp  NEURO: Alert, oriented, grossly unremarkable  PSYCH: Cooperative, mood and affect appropriate.

## 2023-08-22 NOTE — ED PROVIDER NOTE - CLINICAL SUMMARY MEDICAL DECISION MAKING FREE TEXT BOX
Patient complaining of 1.  Cough x2 days with pleuritic chest pain with normal exam, normal sat, afebrile.  Chest x-ray negative on my read, COVID-negative.  Patient given Tessalon, will DC with same.  Discussed supportive care for URI.  EKG with sinus tach without ischemic changes, no significant concern for ACS or PE based on HPI.  2. right knee pain status post twisting injury-x-ray negative on my read, I suspect patient with possible meniscal or ligamentous injury.  Patient given knee immobilizer and will follow-up with Ortho as an outpatient discussed rest ice elevation and compression.  Pt noted to have slight tremor and tachycardia (EKG sinus tach); given librium w improvement.

## 2023-08-22 NOTE — ED ADULT NURSE NOTE - NSFALLRISKINTERV_ED_ALL_ED
Assistance OOB with selected safe patient handling equipment if applicable/Assistance with ambulation/Communicate fall risk and risk factors to all staff, patient, and family/Monitor gait and stability/Monitor for mental status changes and reorient to person, place, and time, as needed/Provide visual cue: yellow wristband, yellow gown, etc/Reinforce activity limits and safety measures with patient and family/Toileting schedule using arm’s reach rule for commode and bathroom/Use of alarms - bed, stretcher, chair and/or video monitoring/Call bell, personal items and telephone in reach/Instruct patient to call for assistance before getting out of bed/chair/stretcher/Non-slip footwear applied when patient is off stretcher/[place] to call system/Physically safe environment - no spills, clutter or unnecessary equipment/Purposeful Proactive Rounding/Room/bathroom lighting operational, light cord in reach

## 2023-08-22 NOTE — ED PROVIDER NOTE - NSFOLLOWUPINSTRUCTIONS_ED_ALL_ED_FT
Cough  R knee pain    Take Tessalon Perles (Benzonatate) 1 capsule every 6-8 hours as needed for cough.    Rest.  Drink plenty of fluids.  Try over the counter medications based on your symptoms; use as directed: sudafed or similar and/or nasal sprays for congestion, robitussin or similar for cough, tylenol and/or ibuprofen or similar for fever, body aches, pain.  Your symptoms will likely last for a total of 7-10 days.  Follow up with your pmd.  Return for increased pain, difficulty breathing, vomiting, any other concerns.     For your knee -   Use RICE:   REST- Rest your hurting/injured joint or extremity to decrease pain and swelling for 24-48 hours    ICE- Apply ice to area of pain to decreased inflammation and pain, put towel/barrier between ice and skin. You can keep ice on for 20 minutes at a time 4-8 times daily   COMPRESSION- Wear ace wrap or brace for support to reduce swelling.  Make sure not to wrap too tight, loosen if skin feeling numb/tingling or skin turns blue   ELEVATION- Elevate hurting/injured area 6 or more inches about level of heart to decrease swelling/inflammation.  Use pillow under joint to elevate area     Take ibuprofen 1 tab every 6 hours with food as needed for pain.  Add tylenol for additional pain relief as needed - use as directed.     Follow up with an orthopedist.  Use the knee immobilizer for comfort; weight bear as tolerated -use cane or crutch as needed.      You may call our referrals coordinator at [phone] Monday to Friday 11am-7pm for assistance with making an appointment

## 2023-08-22 NOTE — ED PROVIDER NOTE - CARE PROVIDER_API CALL
Francisco Luque  Orthopaedic Surgery  [address], [address] [place] [address]  Phone: [phone]  Fax: [phone]  Follow Up Time:

## 2023-08-28 ENCOUNTER — EMERGENCY (EMERGENCY)
Facility: HOSPITAL | Age: 33
LOS: 1 days | Discharge: HOME | End: 2023-08-28
Admitting: EMERGENCY MEDICINE
Payer: MEDICAID

## 2023-08-28 VITALS
HEIGHT: 73 IN | TEMPERATURE: 99 F | DIASTOLIC BLOOD PRESSURE: 69 MMHG | HEART RATE: 104 BPM | RESPIRATION RATE: 16 BRPM | OXYGEN SATURATION: 98 % | SYSTOLIC BLOOD PRESSURE: 103 MMHG

## 2023-08-28 DIAGNOSIS — Z98.890 OTHER SPECIFIED POSTPROCEDURAL STATES: Chronic | ICD-10-CM

## 2023-08-28 PROCEDURE — 99284 EMERGENCY DEPT VISIT MOD MDM: CPT | Mod: 25

## 2023-08-28 RX ORDER — CEPHALEXIN 500 MG
1 CAPSULE ORAL
Qty: 40 | Refills: 0
Start: 2023-08-28 | End: 2023-09-06

## 2023-08-28 RX ORDER — CEPHALEXIN 500 MG
500 CAPSULE ORAL ONCE
Refills: 0 | Status: COMPLETED | OUTPATIENT
Start: 2023-08-28 | End: 2023-08-28

## 2023-08-28 NOTE — ED ADULT NURSE NOTE - NSFALLUNIVINTERV_ED_ALL_ED
Yes...
Bed/Stretcher in lowest position, wheels locked, appropriate side rails in place/Call bell, personal items and telephone in reach/Instruct patient to call for assistance before getting out of bed/chair/stretcher/Non-slip footwear applied when patient is off stretcher/[place] to call system/Physically safe environment - no spills, clutter or unnecessary equipment/Purposeful proactive rounding/Room/bathroom lighting operational, light cord in reach

## 2023-08-28 NOTE — ED PROVIDER NOTE - PATIENT PORTAL LINK FT
You can access the FollowMyHealth Patient Portal offered by [facility] by registering at the following website: [email or web address]. By joining [employer]’s FollowMyHealth portal, you will also be able to view your health information using other applications (apps) compatible with our system.

## 2023-08-28 NOTE — ED ADULT TRIAGE NOTE - CHIEF COMPLAINT QUOTE
Pt. walk in from [facility] ED for chest pain and SOB that started today and is worse with exertion. Pt. was just DC and while walking CP and SOB became worse. Denies n/v/d, dizziness. PMH: alcohol abuse. Last drink yesterday 6pm.

## 2023-08-28 NOTE — ED ADULT NURSE NOTE - CHIEF COMPLAINT QUOTE
Pt. walk in from [facility] ED for chest pain and SOB that started today and is worse with exertion. Pt. was just DC and while walking CP and SOB became worse. Denies n/v/d, dizziness. PMH: alcohol abuse. Last drink yesterday 6pm.

## 2023-08-28 NOTE — ED PROVIDER NOTE - CLINICAL SUMMARY MEDICAL DECISION MAKING FREE TEXT BOX
Patient here with multiple complaints. Appears well   2 wound to left shin with surrounding erythema.   Plan: treat wounds with abx  ekg nonischemic.     Poor historian and poor participant in exam

## 2023-08-28 NOTE — ED PROVIDER NOTE - CROS ED ENDOCRINE ALL NEG
more fluids being ordered, giving as per orders. Awaiting further orders for fluids vent setting to be changed fluids already ordered negative...

## 2023-08-28 NOTE — ED PROVIDER NOTE - OBJECTIVE STATEMENT
32 y/o male here with multiple complaints stating he was just ay [facility]. Patient appears well NAD stable. Endorsing wounds to left leg. Poor historian and poor participant in interview. Poor hygiene. Denies fever, chills, abdominal pain, change in bowel function, flank pain, rash, HA, dizziness, SOB, palpitations, diaphoresis, cough, and malaise.

## 2023-08-31 DIAGNOSIS — Z00.00 ENCOUNTER FOR GENERAL ADULT MEDICAL EXAMINATION WITHOUT ABNORMAL FINDINGS: ICD-10-CM

## 2023-08-31 DIAGNOSIS — Z86.59 PERSONAL HISTORY OF OTHER MENTAL AND BEHAVIORAL DISORDERS: ICD-10-CM
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
## 2023-08-31 DIAGNOSIS — R07.9 CHEST PAIN, UNSPECIFIED: ICD-10-CM
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
## 2023-08-31 DIAGNOSIS — S81.802A UNSPECIFIED OPEN WOUND, LEFT LOWER LEG, INITIAL ENCOUNTER: ICD-10-CM

## 2023-08-31 DIAGNOSIS — R00.0 TACHYCARDIA, UNSPECIFIED: ICD-10-CM

## 2023-10-05 NOTE — ED ADULT NURSE NOTE - SCORE
Would like to move forward with this Spectrum Disorder. She needs to speak with you about getting information and a referral for the insurance company to pursue this.     Docia Francia  [phone]

## 2023-11-27 ENCOUNTER — EMERGENCY (EMERGENCY)
Facility: HOSPITAL | Age: 33
LOS: 1 days | Discharge: HOME | End: 2023-11-27
Attending: EMERGENCY MEDICINE | Admitting: EMERGENCY MEDICINE
Payer: MEDICAID

## 2023-11-27 VITALS
OXYGEN SATURATION: 98 % | HEIGHT: 74 IN | HEART RATE: 72 BPM | DIASTOLIC BLOOD PRESSURE: 87 MMHG | RESPIRATION RATE: 18 BRPM | SYSTOLIC BLOOD PRESSURE: 141 MMHG | WEIGHT: 210.1 LBS | TEMPERATURE: 98 F

## 2023-11-27 DIAGNOSIS — Z98.890 OTHER SPECIFIED POSTPROCEDURAL STATES: Chronic | ICD-10-CM

## 2023-11-27 DIAGNOSIS — F10.129 ALCOHOL ABUSE WITH INTOXICATION, UNSPECIFIED: ICD-10-CM

## 2023-11-27 DIAGNOSIS — R41.82 ALTERED MENTAL STATUS, UNSPECIFIED: ICD-10-CM

## 2023-11-27 PROCEDURE — 99283 EMERGENCY DEPT VISIT LOW MDM: CPT

## 2023-11-27 NOTE — ED ADULT NURSE NOTE - CHIEF COMPLAINT QUOTE
BIBA [facility]. Pt admits to drinking etoh tonight. No obvious injuries reported or observed. No further at triage.

## 2023-11-27 NOTE — ED ADULT TRIAGE NOTE - CHIEF COMPLAINT QUOTE
BIBA [facility]. Pt admits to drinking etoh tonight. No obvious injuries reported or observed. No further at triage.

## 2023-11-27 NOTE — ED ADULT NURSE NOTE - NSFALLHARMRISKINTERV_ED_ALL_ED
Assistance OOB with selected safe patient handling equipment if applicable/Assistance with ambulation/Communicate risk of Fall with Harm to all staff, patient, and family/Monitor gait and stability/Monitor for mental status changes and reorient to person, place, and time, as needed/Provide visual cue: red socks, yellow wristband, yellow gown, etc/Reinforce activity limits and safety measures with patient and family/Toileting schedule using arm’s reach rule for commode and bathroom/Use of alarms - bed, stretcher, chair and/or video monitoring/Bed in lowest position, wheels locked, appropriate side rails in place/Call bell, personal items and telephone in reach/Instruct patient to call for assistance before getting out of bed/chair/stretcher/Non-slip footwear applied when patient is off stretcher/[place] to call system/Physically safe environment - no spills, clutter or unnecessary equipment/Purposeful Proactive Rounding/Room/bathroom lighting operational, light cord in reach

## 2023-11-28 VITALS
OXYGEN SATURATION: 98 % | RESPIRATION RATE: 16 BRPM | SYSTOLIC BLOOD PRESSURE: 112 MMHG | DIASTOLIC BLOOD PRESSURE: 77 MMHG | TEMPERATURE: 98 F | HEART RATE: 96 BPM

## 2023-11-28 NOTE — ED PROVIDER NOTE - OBJECTIVE STATEMENT
33-year-old male arrives intoxicated and altered unable to provide a history or cooperate with physical exam

## 2023-11-28 NOTE — ED PROVIDER NOTE - PATIENT PORTAL LINK FT
You can access the FollowMyHealth Patient Portal offered by [facility] by registering at the following website: [email or web address]. By joining [employer]’s FollowMyHealth portal, you will also be able to view your health information using other applications (apps) compatible with our system.

## 2024-01-25 ENCOUNTER — EMERGENCY (EMERGENCY)
Facility: HOSPITAL | Age: 34
LOS: 1 days | Discharge: HOME | End: 2024-01-25
Attending: EMERGENCY MEDICINE | Admitting: EMERGENCY MEDICINE
Payer: MEDICAID

## 2024-01-25 VITALS
RESPIRATION RATE: 18 BRPM | TEMPERATURE: 97 F | OXYGEN SATURATION: 98 % | HEIGHT: 74 IN | HEART RATE: 100 BPM | SYSTOLIC BLOOD PRESSURE: 106 MMHG | DIASTOLIC BLOOD PRESSURE: 74 MMHG

## 2024-01-25 VITALS
TEMPERATURE: 98 F | RESPIRATION RATE: 18 BRPM | HEART RATE: 93 BPM | SYSTOLIC BLOOD PRESSURE: 100 MMHG | OXYGEN SATURATION: 97 % | DIASTOLIC BLOOD PRESSURE: 61 MMHG

## 2024-01-25 DIAGNOSIS — Z98.890 OTHER SPECIFIED POSTPROCEDURAL STATES: Chronic | ICD-10-CM
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
## 2024-01-25 DIAGNOSIS — F10.20 ALCOHOL DEPENDENCE, UNCOMPLICATED: ICD-10-CM

## 2024-01-25 DIAGNOSIS — F17.200 NICOTINE DEPENDENCE, UNSPECIFIED, UNCOMPLICATED: ICD-10-CM

## 2024-01-25 LAB — GLUCOSE BLDC GLUCOMTR-MCNC: 106 MG/DL — HIGH (ref 70–99)

## 2024-01-25 PROCEDURE — 99284 EMERGENCY DEPT VISIT MOD MDM: CPT

## 2024-01-25 RX ADMIN — Medication 25 MILLIGRAM(S): at 06:28

## 2024-01-25 RX ADMIN — Medication 25 MILLIGRAM(S): at 01:59

## 2024-01-25 NOTE — ED ADULT NURSE NOTE - CCCP TRG CHIEF CMPLNT
withdrawal oral-pharyngeal swallow skills within normal limits for age with underlying COPD and dementia:

## 2024-01-25 NOTE — ED PROVIDER NOTE - NSFOLLOWUPINSTRUCTIONS_ED_ALL_ED_FT
Please read all handouts provided to you from the emergency department. Seek immediate medical attention for any new/worsening signs or symptoms.  Take any prescribed medications as directed. Please follow up with  your primary physician in the next 3-5 days.    To access your record on the patient portal [facility], please visit:  [email or web address]  If you are having difficulties setting this up, call [phone] and someone can assist you over the phone.       DETOX/REHAB PROGRAMS:  [facility] Short Term Free Detox [facility] - [address] [place] [address]    [facility] - [address] [place] [address] Kathryn Rendon [facility] ([phone])         You presented with signs and symptoms of alcohol intoxication and withdrawal. A history of heavy drinking puts you at risk for alcohol withdrawal after a period without drinking. The withdrawal period can last several days and can cause severe symptoms, even death. Therefore you were managed in the hospital with medications to help with your symptoms and prevent complications.    It is important that you continue to avoid drinking. Alcohol is responsible for several physical, mental, and social problems. These include injury to your heart, brain, and liver. Alcohol is also responsible for a large number of injuries and deaths. You are at risk of these problems and also at risk of going through withdrawal again if you resume drinking.    There are many support groups and rehabilitation programs available to those seeking to reduce or quit alcohol use. Please make use of them. Please also follow up with your primary care doctor for continued medical care.    If you need immediate assistance with substance abuse you may contact the [facility] by calling [phone].  [facility]: [phone]. [facility] -[phone]

## 2024-01-25 NOTE — ED PROVIDER NOTE - CLINICAL SUMMARY MEDICAL DECISION MAKING FREE TEXT BOX
33-year-old male history of alcohol use disorder  Patient brought in by ambulance after calling for himself out of concern of his alcohol use over the past 5 days.  Patient states that he has been drinking a pint of vodka and a 12 pack of beer daily; last drink was approximately 2 hours prior to arrival.  Denies any other substance use.  Was at detox over Kd but signed himself out New Year's Nita.  Is feeling symptoms of nausea, mild anxiety, increased psychomotor agitation and is concerned because he has had seizures in the past from alcohol withdrawal.    PE: Mildly unkempt, nontoxic-appearing, alert and oriented x 3, mild increased psychomotor agitation.  Nonlabored respirations clear to auscultation bilateral.  Heart regular rate and rhythm.  Abdomen soft nontender/nondistended.  No tongue fasciculation or tremors on exam.  Patient denies auditory hallucinations or visual hallucinations. CIWA 6.    MDM: Patient presents for concern of his alcohol use.  Was in detox last month but signed himself out early and has been on a mendiola for the past week.  Would like to go back to detox.  Current CIWA of 6; will give Librium.  Plan to discharge patient with Librium prescription and information for detox programs.  He last was in detox at [facility] and knows he can go back there if needed.

## 2024-01-25 NOTE — ED PROVIDER NOTE - OBJECTIVE STATEMENT
33-year-old male history of alcohol use disorder  Patient brought in by ambulance after calling for himself out of concern of his alcohol use over the past 5 days.  Patient states that he has been drinking a pint of vodka and a 12 pack of beer daily; last drink was approximately 2 hours prior to arrival.  Denies any other substance use.  Was at detox over Christmas but signed himself out New Year's Nita.  Is feeling symptoms of nausea, mild anxiety, increased psychomotor agitation and is concerned because he has had seizures in the past from alcohol withdrawal.

## 2024-01-25 NOTE — ED ADULT NURSE NOTE - OBJECTIVE STATEMENT
34 y/o M here with 6 day mendiola with alcohol reports he is in withdrawal his last drink was about 2 hours ago. No tremors noted in triage. Endorses hx. of withdrawal seizures.

## 2024-01-25 NOTE — ED PROVIDER NOTE - PATIENT PORTAL LINK FT
You can access the FollowMyHealth Patient Portal offered by [facility] by registering at the following website: [email or web address]. By joining [employer]’s FollowMyHealth portal, you will also be able to view your health information using other applications (apps) compatible with our system.

## 2024-01-25 NOTE — ED ADULT TRIAGE NOTE - CHIEF COMPLAINT QUOTE
pt. presents reports hes been on a 6 day mendiola with alcohol reports he is in withdrawal his last drink was about 2 hours ago. No tremors noted in triage. Endorses hx. of withdrawal seizures.

## 2024-01-25 NOTE — ED PROVIDER NOTE - PHYSICAL EXAMINATION
PE: Mildly unkempt, nontoxic-appearing, alert and oriented x 3, mild increased psychomotor agitation.  Nonlabored respirations clear to auscultation bilateral.  Heart regular rate and rhythm.  Abdomen soft nontender/nondistended.  No tongue fasciculation or tremors on exam.  Patient denies auditory hallucinations or visual hallucinations. CIWA 6.

## 2024-03-15 NOTE — ED ADULT TRIAGE NOTE - IDEAL BODY WEIGHT(KG)
78 Downtrending from peak of 14 during previous hospitalization, unclear if iso Hizentra use vs. reactive iso GCA?  - CTM; patient with no signs of infection at this time

## 2024-03-15 NOTE — ED ADULT NURSE NOTE - NURSING ED SKIN COLOR
Verbal shift change report given to ADELSO Irvin  (oncoming nurse) by ADELSO Rebollar (offgoing nurse). Report included the following information Nurse Handoff Report, ED Encounter Summary, ED SBAR, MAR, and Neuro Assessment.     
normal for race

## 2024-04-29 NOTE — ED ADULT NURSE NOTE - LOCATION
FEMALE ANNUAL EXAM NOTE      HISTORY    Jamison Collins is a 54 year old female who presents for an annual exam. Has ob/gyn.  Currently receiving hormone replacement therapy.  This started a couple of years ago after having postmenopausal bleeding.  EMB was completed, this was negative.  Progesterone had been increased from 100 mg to 200 mg because she was still having breakthrough bleeding.  Recently she noticed that her breasts seen larger, noticed she had this same problem when she was taking oral birth control.  Discussed this with her OB/GYN who recommended 200 mg every other day of the progesterone.  She was going to bump her back down to 100 mg every day, but patient had just picked up a prescription for 200 mg and could not cut the tablet in half.  She is wondering a good this has.  Patient was also having some problems sleeping at night, had been taking Ambien.  Had not needed it when she started hormone replacement therapy.  Moods had returned to normal with the HRT.  Recently had a fracture of her left shoulder.  Happened when she was skiing, another skier took her out.  She is being treated by Dr. Barrientos.  Their staff had recommended that she start taking calcium.  She is wondering about bone density testing.  She denies personal history of rheumatoid arthritis, long-term history of steroid use, she does not smoke, mom or dad/neither 1 of them has a history of hip fracture.  She does drink 4-6 glasses of wine a week.  She is typically quite active, yoga and other exercises.  Has not been able to do anything for the last 7-8 weeks because of her shoulder.    She is also concerned about diabetes.  It does run in her family, especially on her other side.  I believe her sister recently had a elevated A1c.  Blood work that she had completed in 2022 by her last primary did have a slightly low platelet count, fasting glucose of 104.  Other labs were stable.  She is also concerned about constipation that seems  to be coming along with menopause.  She has tried this fiber supplement, not helpful.    Mother alive at 80, high blood pressure and disorder with high platelets.  Father alive at age 80 with type 2 diabetes, diet controlled.  She has a sister age 57 alive and well and a brother age 50 alive and well.  She has 2 sons ages 23 and 30 that are both alive and well.  Does not smoke, never has.  Two cups of regular coffee a day, no additional caffeine use.  4-6 glasses of wine per week.  She is a hairdresser.    MEDICAL HISTORY    Past Medical History:   Diagnosis Date   • Abnormal Pap smear of cervix     HGSIL       SURGICAL HISTORY    Past Surgical History:   Procedure Laterality Date   • Colposcopy  12/22/2015   • No past surgeries         SOCIAL HISTORY    Social History     Tobacco Use   • Smoking status: Never   • Smokeless tobacco: Never   Vaping Use   • Vaping status: never used   Substance Use Topics   • Alcohol use: Yes     Alcohol/week: 4.0 - 6.0 standard drinks of alcohol     Types: 4 - 6 Glasses of wine per week     Comment: social   • Drug use: No       FAMILY HISTORY    Family History   Problem Relation Age of Onset   • Atrial Fibrilliation Mother    • Blood Disorder Mother         high plts   • Diabetes Father    • Patient is unaware of any medical problems Sister    • Patient is unaware of any medical problems Brother    • Patient is unaware of any medical problems Maternal Grandmother    • Dementia/Alzheimers Maternal Grandfather    • Cancer, Ovarian Paternal Grandmother    • Patient is unaware of any medical problems Paternal Grandfather    • Patient is unaware of any medical problems Son    • Patient is unaware of any medical problems Son    • Cancer, Breast Neg Hx    • Cancer, Colon Neg Hx    • Cancer, Endometrial Neg Hx        MEDICATIONS    Current Outpatient Medications   Medication Sig   • calcium carbonate (OS-MUSHTAQ) 600 MG Tab Take 1,000 mg by mouth in the morning and 1,000 mg in the evening.   •  Magnesium Bisglycinate (MAG GLYCINATE PO) Take 1 capsule by mouth daily.   • Cholecalciferol (QC Vitamin D3) 50 mcg (2,000 units) tablet Take 50 mcg by mouth daily.   • estradiol (Estrace) 1 MG tablet Take 1 tablet by mouth daily.   • Progesterone 200 MG Cap Take 1 capsule by mouth daily.     No current facility-administered medications for this visit.       ALLERGIES    ALLERGIES:  No Known Allergies    REVIEW OF SYSTEMS       All other Review of Systems negative.      HEALTH MAINTENANCE ISSUES: Updated and reviewed.    PHYSICAL EXAMINATION    Vital Signs:    Vitals:    04/29/24 1307   BP: 122/80   BP [place] [facility] [place]   Patient Position: Sitting   Cuff Size: Regular   Pulse: 68   Resp: 16   Temp: 98.5 °F (36.9 °C)   TempSrc: Tympanic   SpO2: 98%   Weight: 63.5 kg (140 lb)   Height: 5' 7\" (1.702 m)     General:  Well-developed, well-nourished. In no apparent distress.    Eyes:  PERRL, EOMI(Pupils equal, round, reactive to light, extraocular movements intact). Conjunctivae pink. Sclerae anicteric.    HENT:  Normocephalic, atraumatic. Bilateral external ears are normal. Mucosal membranes moist. External nose is normal. Oropharynx is clear.    Neck:  Supple. Nontender. Normal range of motion. Trachea midline. No thyromegaly.   Respiratory:  Normal respiratory effort. No chest wall tenderness. Lungs clear to auscultation bilaterally. Symmetrical chest expansion.    Cardiovascular:  Regular rate and rhythm. No murmurs, rubs, or gallops. Normal S1 and S2. No S3 or S4.  No peripheral edema.  Gastrointestinal:  Soft. Nontender. Non-distended. Normal bowel sounds. No pulsatile or other abdominal masses. No hepatosplenomegaly or splenomegaly.    Musculoskeletal:  No clubbing or cyanosis. Full range of motion in all 4 extremities proximal and distal.     Neurologic:  Alert and oriented times 3. Gait is normal. Normal sensory function. No motor deficits in all 4 extremities.   Integumentary:  Warm. Dry.  Pink. No rashes or lesions. No wounds.    Psychiatric: Cooperative. Appropriate mood and affect. Normal judgment.        ASSESSMENT/PLAN    1. Laboratory examination ordered as part of a routine general medical examination  Labs in the next 1-2 weeks.  Will notify her of results once obtained.  Mildly fasting glucose of 104 in 2022 slightly low platelets.  - Basic Metabolic Panel; Future  - Glycohemoglobin; Future  - Lipid Panel With Reflex; Future  - CBC No Differential; Future  - Thyroid Stimulating Hormone Reflex; Future  - Hepatic Function Panel; Future    2. Routine general medical examination at health care facility  General health maintenance discussed.  Recommend exercise most days of the week some type cardio and resistance training for least 30 minutes of both.  Calcium and vitamin D3 recommendations reviewed.  We discussed bone density screening.  Could consider doing this secondary to alcohol intake and recent fracture.  Discussed osteopenia versus osteoporosis and differences in treatment.  Will also get records from Dr. Barrientos office for review.  Should this show concerns about bone thinning or bone loss will let her know.  She is going to hold off on bone density testing for right now.  Recommend that she continue to follow-up with her OB/GYN for the postmenopausal bleeding, hormone replacement therapy.    3. Constipation, unspecified constipation type  Has tried fiber supplements, not helpful.  Can consider MiraLax daily.    4. History of shoulder fracture  Followed by orthopedics; Dr. Barrientos.  See 2.     Follow-up in 1 year; sooner if needed.  Patient verbalized an understanding.          Yanique Richardson NP     leg

## 2024-06-18 NOTE — ED ADULT NURSE NOTE - PAIN: PRESENCE, MLM
[FreeTextEntry1] : 60yF with necrotizing myopathy, positive anti SRP, + RF, + RNP. Likely due to UCTD/MCTD with a myositis component. here for follow up for necrotizing myopathy/UCTD and Joint Pain (OA) and aymetric clavicle   #necrotizing myopathy (SRP positive) - Will check laboratory tests to look for markers of disease activity and also to assess for medication toxicity. -  cxr WNL  - stop MMF and start Rituxan this week   #mononeuropathy of UE b/l - gabapentin as above mg TID   - follow up neurology  #secondary fibromyalgia - poor sleep hygiene, multiple tender points, anxiety - not currently active   #structural back disease - improved and not active  # GERD - improved and not active , continue omprazole.   # clavicle assymetry  2024 MRI with djd right SC joint and mild degen change of bilateral AC joints   HCM Prevnar in dec 2019 and pneumovax in march 2020 Moderna x 2 (2-2021) and booster in 8-2021 and to get booster in fall 2022 (bivalent) Flu shot fall 2023   Bone Health DeXA 4-2022 - normal Continue calcium total 1200 mg. repeat dexa in 4-2025   More than 50% of the encounter was spent counseling TESSY HERNANDEZ on the differential, workup, disease course, and treatment/management.   Education was provided to TESSY HERNANDEZ during this encounter. All questions and concerns were answered and addressed in detail.  TESSY HERNANDEZ verbalized understanding and agreed to the plan.   TESSY HERNANDEZ has been instructed to call for an earlier appointment if new symptoms develop in the interim. TESSY HERNANDEZ has been instructed to make a follow-up appointment in 6 weeks    
complains of pain/discomfort

## 2024-06-25 ENCOUNTER — EMERGENCY (EMERGENCY)
Facility: HOSPITAL | Age: 34
LOS: 1 days | Discharge: LEFT BEFORE DISCHARGE | End: 2024-06-25
Attending: EMERGENCY MEDICINE | Admitting: EMERGENCY MEDICINE
Payer: SELF-PAY

## 2024-06-25 VITALS
TEMPERATURE: 99 F | WEIGHT: 169.98 LBS | OXYGEN SATURATION: 95 % | RESPIRATION RATE: 17 BRPM | SYSTOLIC BLOOD PRESSURE: 102 MMHG | HEART RATE: 129 BPM | DIASTOLIC BLOOD PRESSURE: 72 MMHG

## 2024-06-25 DIAGNOSIS — Z98.890 OTHER SPECIFIED POSTPROCEDURAL STATES: Chronic | ICD-10-CM

## 2024-06-25 PROCEDURE — L9991: CPT

## 2024-06-25 NOTE — ED ADULT TRIAGE NOTE - CHIEF COMPLAINT QUOTE
BIBEMS for AMS admit to etoh intox. arrives awake with slurred speech. . Multiple healing abrasions to face and body. denies drug use. picked up at [address]

## 2024-06-27 DIAGNOSIS — Z53.21 PROCEDURE AND TREATMENT NOT CARRIED OUT DUE TO PATIENT LEAVING PRIOR TO BEING SEEN BY HEALTH CARE PROVIDER: ICD-10-CM

## 2024-06-27 DIAGNOSIS — R41.82 ALTERED MENTAL STATUS, UNSPECIFIED: ICD-10-CM

## 2024-07-29 ENCOUNTER — EMERGENCY (EMERGENCY)
Facility: HOSPITAL | Age: 34
LOS: 1 days | Discharge: HOME | End: 2024-07-29
Attending: EMERGENCY MEDICINE | Admitting: EMERGENCY MEDICINE
Payer: MEDICAID

## 2024-07-29 VITALS
SYSTOLIC BLOOD PRESSURE: 125 MMHG | HEIGHT: 73 IN | WEIGHT: 169.98 LBS | OXYGEN SATURATION: 98 % | HEART RATE: 132 BPM | RESPIRATION RATE: 18 BRPM | TEMPERATURE: 98 F | DIASTOLIC BLOOD PRESSURE: 86 MMHG

## 2024-07-29 VITALS
RESPIRATION RATE: 16 BRPM | SYSTOLIC BLOOD PRESSURE: 124 MMHG | OXYGEN SATURATION: 97 % | DIASTOLIC BLOOD PRESSURE: 81 MMHG | TEMPERATURE: 98 F | HEART RATE: 100 BPM

## 2024-07-29 DIAGNOSIS — F10.20 ALCOHOL DEPENDENCE, UNCOMPLICATED: ICD-10-CM

## 2024-07-29 DIAGNOSIS — Z98.890 OTHER SPECIFIED POSTPROCEDURAL STATES: Chronic | ICD-10-CM

## 2024-07-29 DIAGNOSIS — R21 RASH AND OTHER NONSPECIFIC SKIN ERUPTION: ICD-10-CM

## 2024-07-29 DIAGNOSIS — F15.99 OTHER STIMULANT USE, UNSPECIFIED WITH UNSPECIFIED STIMULANT-INDUCED DISORDER: ICD-10-CM

## 2024-07-29 LAB
ALBUMIN SERPL ELPH-MCNC: 3.1 G/DL — LOW (ref 3.4–5)
ALP SERPL-CCNC: 127 U/L — HIGH (ref 40–120)
ALT FLD-CCNC: 97 U/L — HIGH (ref 12–42)
AMPHET UR-MCNC: POSITIVE
ANION GAP SERPL CALC-SCNC: 13 MMOL/L — SIGNIFICANT CHANGE UP (ref 9–16)
ANION GAP SERPL CALC-SCNC: 6 MMOL/L — LOW (ref 9–16)
APPEARANCE UR: CLEAR — SIGNIFICANT CHANGE UP
AST SERPL-CCNC: 115 U/L — HIGH (ref 15–37)
BARBITURATES UR SCN-MCNC: NEGATIVE — SIGNIFICANT CHANGE UP
BASOPHILS # BLD AUTO: 0.03 K/UL — SIGNIFICANT CHANGE UP (ref 0–0.2)
BASOPHILS NFR BLD AUTO: 0.4 % — SIGNIFICANT CHANGE UP (ref 0–2)
BENZODIAZ UR-MCNC: NEGATIVE — SIGNIFICANT CHANGE UP
BILIRUB SERPL-MCNC: 0.9 MG/DL — SIGNIFICANT CHANGE UP (ref 0.2–1.2)
BILIRUB UR-MCNC: NEGATIVE — SIGNIFICANT CHANGE UP
BUN SERPL-MCNC: 4 MG/DL — LOW (ref 7–23)
BUN SERPL-MCNC: 4 MG/DL — LOW (ref 7–23)
CALCIUM SERPL-MCNC: 7.6 MG/DL — LOW (ref 8.5–10.5)
CALCIUM SERPL-MCNC: 8.2 MG/DL — LOW (ref 8.5–10.5)
CHLORIDE SERPL-SCNC: 103 MMOL/L — SIGNIFICANT CHANGE UP (ref 96–108)
CHLORIDE SERPL-SCNC: 99 MMOL/L — SIGNIFICANT CHANGE UP (ref 96–108)
CO2 SERPL-SCNC: 28 MMOL/L — SIGNIFICANT CHANGE UP (ref 22–31)
CO2 SERPL-SCNC: 32 MMOL/L — HIGH (ref 22–31)
COCAINE METAB.OTHER UR-MCNC: NEGATIVE — SIGNIFICANT CHANGE UP
COLOR SPEC: YELLOW — SIGNIFICANT CHANGE UP
CREAT SERPL-MCNC: 0.59 MG/DL — SIGNIFICANT CHANGE UP (ref 0.5–1.3)
CREAT SERPL-MCNC: 0.67 MG/DL — SIGNIFICANT CHANGE UP (ref 0.5–1.3)
DIFF PNL FLD: NEGATIVE — SIGNIFICANT CHANGE UP
EGFR: 125 ML/MIN/1.73M2 — SIGNIFICANT CHANGE UP
EGFR: 130 ML/MIN/1.73M2 — SIGNIFICANT CHANGE UP
EOSINOPHIL # BLD AUTO: 0.02 K/UL — SIGNIFICANT CHANGE UP (ref 0–0.5)
EOSINOPHIL NFR BLD AUTO: 0.2 % — SIGNIFICANT CHANGE UP (ref 0–6)
FENTANYL UR QL SCN: NEGATIVE — SIGNIFICANT CHANGE UP
GLUCOSE SERPL-MCNC: 188 MG/DL — HIGH (ref 70–99)
GLUCOSE SERPL-MCNC: 65 MG/DL — LOW (ref 70–99)
GLUCOSE UR QL: NEGATIVE MG/DL — SIGNIFICANT CHANGE UP
HCT VFR BLD CALC: 45.9 % — SIGNIFICANT CHANGE UP (ref 39–50)
HGB BLD-MCNC: 16.5 G/DL — SIGNIFICANT CHANGE UP (ref 13–17)
HIV 1 & 2 AB SERPL IA.RAPID: SIGNIFICANT CHANGE UP
IMM GRANULOCYTES NFR BLD AUTO: 0.4 % — SIGNIFICANT CHANGE UP (ref 0–0.9)
KETONES UR-MCNC: NEGATIVE MG/DL — SIGNIFICANT CHANGE UP
LACTATE BLDV-MCNC: 2.1 MMOL/L — HIGH (ref 0.5–2)
LACTATE BLDV-MCNC: 2.4 MMOL/L — HIGH (ref 0.5–2)
LACTATE BLDV-MCNC: 3.9 MMOL/L — HIGH (ref 0.5–2)
LEUKOCYTE ESTERASE UR-ACNC: NEGATIVE — SIGNIFICANT CHANGE UP
LYMPHOCYTES # BLD AUTO: 1.77 K/UL — SIGNIFICANT CHANGE UP (ref 1–3.3)
LYMPHOCYTES # BLD AUTO: 20.8 % — SIGNIFICANT CHANGE UP (ref 13–44)
MCHC RBC-ENTMCNC: 34.7 PG — HIGH (ref 27–34)
MCHC RBC-ENTMCNC: 35.9 GM/DL — SIGNIFICANT CHANGE UP (ref 32–36)
MCV RBC AUTO: 96.4 FL — SIGNIFICANT CHANGE UP (ref 80–100)
METHADONE UR-MCNC: NEGATIVE — SIGNIFICANT CHANGE UP
MONOCYTES # BLD AUTO: 0.85 K/UL — SIGNIFICANT CHANGE UP (ref 0–0.9)
MONOCYTES NFR BLD AUTO: 10 % — SIGNIFICANT CHANGE UP (ref 2–14)
NEUTROPHILS # BLD AUTO: 5.8 K/UL — SIGNIFICANT CHANGE UP (ref 1.8–7.4)
NEUTROPHILS NFR BLD AUTO: 68.2 % — SIGNIFICANT CHANGE UP (ref 43–77)
NITRITE UR-MCNC: NEGATIVE — SIGNIFICANT CHANGE UP
NRBC # BLD: 0 /100 WBCS — SIGNIFICANT CHANGE UP (ref 0–0)
OPIATES UR-MCNC: NEGATIVE — SIGNIFICANT CHANGE UP
PCP SPEC-MCNC: SIGNIFICANT CHANGE UP
PCP UR-MCNC: NEGATIVE — SIGNIFICANT CHANGE UP
PH UR: 7.5 — SIGNIFICANT CHANGE UP (ref 5–8)
PLATELET # BLD AUTO: 206 K/UL — SIGNIFICANT CHANGE UP (ref 150–400)
POTASSIUM SERPL-MCNC: 2.9 MMOL/L — CRITICAL LOW (ref 3.5–5.3)
POTASSIUM SERPL-MCNC: 2.9 MMOL/L — CRITICAL LOW (ref 3.5–5.3)
POTASSIUM SERPL-SCNC: 2.9 MMOL/L — CRITICAL LOW (ref 3.5–5.3)
POTASSIUM SERPL-SCNC: 2.9 MMOL/L — CRITICAL LOW (ref 3.5–5.3)
PROT SERPL-MCNC: 7.8 G/DL — SIGNIFICANT CHANGE UP (ref 6.4–8.2)
PROT UR-MCNC: NEGATIVE MG/DL — SIGNIFICANT CHANGE UP
RBC # BLD: 4.76 M/UL — SIGNIFICANT CHANGE UP (ref 4.2–5.8)
RBC # FLD: 17.6 % — HIGH (ref 10.3–14.5)
SODIUM SERPL-SCNC: 140 MMOL/L — SIGNIFICANT CHANGE UP (ref 132–145)
SODIUM SERPL-SCNC: 141 MMOL/L — SIGNIFICANT CHANGE UP (ref 132–145)
SP GR SPEC: 1 — LOW (ref 1–1.03)
THC UR QL: NEGATIVE — SIGNIFICANT CHANGE UP
UROBILINOGEN FLD QL: 0.2 MG/DL — SIGNIFICANT CHANGE UP (ref 0.2–1)
WBC # BLD: 8.5 K/UL — SIGNIFICANT CHANGE UP (ref 3.8–10.5)
WBC # FLD AUTO: 8.5 K/UL — SIGNIFICANT CHANGE UP (ref 3.8–10.5)

## 2024-07-29 PROCEDURE — 71045 X-RAY EXAM CHEST 1 VIEW: CPT | Mod: 26

## 2024-07-29 PROCEDURE — 99285 EMERGENCY DEPT VISIT HI MDM: CPT

## 2024-07-29 RX ORDER — DEXTROSE MONOHYDRATE AND SODIUM CHLORIDE 5; .3 G/100ML; G/100ML
1000 INJECTION, SOLUTION INTRAVENOUS ONCE
Refills: 0 | Status: COMPLETED | OUTPATIENT
Start: 2024-07-29 | End: 2024-07-29

## 2024-07-29 RX ORDER — LORAZEPAM 0.5 MG
2 TABLET ORAL ONCE
Refills: 0 | Status: DISCONTINUED | OUTPATIENT
Start: 2024-07-29 | End: 2024-07-29

## 2024-07-29 RX ORDER — DEXTROSE MONOHYDRATE AND SODIUM CHLORIDE 5; .3 G/100ML; G/100ML
1000 INJECTION, SOLUTION INTRAVENOUS
Refills: 0 | Status: DISCONTINUED | OUTPATIENT
Start: 2024-07-29 | End: 2024-07-29

## 2024-07-29 RX ORDER — DOXYCYCLINE 100 MG/1
100 CAPSULE ORAL ONCE
Refills: 0 | Status: COMPLETED | OUTPATIENT
Start: 2024-07-29 | End: 2024-07-29

## 2024-07-29 RX ORDER — POTASSIUM CHLORIDE 600 MG/1
40 TABLET, FILM COATED, EXTENDED RELEASE ORAL ONCE
Refills: 0 | Status: COMPLETED | OUTPATIENT
Start: 2024-07-29 | End: 2024-07-29

## 2024-07-29 RX ORDER — LORAZEPAM 0.5 MG
1 TABLET ORAL ONCE
Refills: 0 | Status: DISCONTINUED | OUTPATIENT
Start: 2024-07-29 | End: 2024-07-29

## 2024-07-29 RX ORDER — SODIUM CHLORIDE 0.9 % (FLUSH) 0.9 %
1000 SYRINGE (ML) INJECTION ONCE
Refills: 0 | Status: COMPLETED | OUTPATIENT
Start: 2024-07-29 | End: 2024-07-29

## 2024-07-29 RX ORDER — CHLORDIAZEPOXIDE HYDROCHLORIDE 10 MG/1
50 CAPSULE ORAL ONCE
Refills: 0 | Status: DISCONTINUED | OUTPATIENT
Start: 2024-07-29 | End: 2024-07-29

## 2024-07-29 RX ORDER — DOXYCYCLINE 100 MG/1
1 CAPSULE ORAL
Qty: 28 | Refills: 0
Start: 2024-07-29 | End: 2024-08-11

## 2024-07-29 RX ORDER — ONDANSETRON HYDROCHLORIDE 2 MG/ML
4 INJECTION INTRAMUSCULAR; INTRAVENOUS ONCE
Refills: 0 | Status: COMPLETED | OUTPATIENT
Start: 2024-07-29 | End: 2024-07-29

## 2024-07-29 RX ORDER — DEXTROSE MONOHYDRATE AND SODIUM CHLORIDE 5; .3 G/100ML; G/100ML
1000 INJECTION, SOLUTION INTRAVENOUS
Refills: 0 | Status: ACTIVE | OUTPATIENT
Start: 2024-07-29 | End: 2025-06-27

## 2024-07-29 RX ORDER — MAGNESIUM SULFATE 100 %
2 POWDER (GRAM) MISCELLANEOUS ONCE
Refills: 0 | Status: COMPLETED | OUTPATIENT
Start: 2024-07-29 | End: 2024-07-29

## 2024-07-29 RX ADMIN — DEXTROSE MONOHYDRATE AND SODIUM CHLORIDE 1000 MILLILITER(S): 5; .3 INJECTION, SOLUTION INTRAVENOUS at 16:45

## 2024-07-29 RX ADMIN — Medication 25 GRAM(S): at 18:44

## 2024-07-29 RX ADMIN — CHLORDIAZEPOXIDE HYDROCHLORIDE 50 MILLIGRAM(S): 10 CAPSULE ORAL at 18:22

## 2024-07-29 RX ADMIN — POTASSIUM CHLORIDE 40 MILLIEQUIVALENT(S): 600 TABLET, FILM COATED, EXTENDED RELEASE ORAL at 18:43

## 2024-07-29 RX ADMIN — DOXYCYCLINE 110 MILLIGRAM(S): 100 CAPSULE ORAL at 13:49

## 2024-07-29 RX ADMIN — DEXTROSE MONOHYDRATE AND SODIUM CHLORIDE 500 MILLILITER(S): 5; .3 INJECTION, SOLUTION INTRAVENOUS at 14:46

## 2024-07-29 RX ADMIN — Medication 2 MILLIGRAM(S): at 13:33

## 2024-07-29 RX ADMIN — ONDANSETRON HYDROCHLORIDE 4 MILLIGRAM(S): 2 INJECTION INTRAMUSCULAR; INTRAVENOUS at 13:32

## 2024-07-29 RX ADMIN — CHLORDIAZEPOXIDE HYDROCHLORIDE 50 MILLIGRAM(S): 10 CAPSULE ORAL at 13:28

## 2024-07-29 RX ADMIN — Medication 1000 MILLILITER(S): at 18:45

## 2024-07-29 RX ADMIN — Medication 1000 MILLILITER(S): at 13:49

## 2024-07-29 RX ADMIN — DEXTROSE MONOHYDRATE AND SODIUM CHLORIDE 1000 MILLILITER(S): 5; .3 INJECTION, SOLUTION INTRAVENOUS at 18:43

## 2024-07-29 RX ADMIN — DOXYCYCLINE 100 MILLIGRAM(S): 100 CAPSULE ORAL at 14:48

## 2024-07-29 RX ADMIN — POTASSIUM CHLORIDE 40 MILLIEQUIVALENT(S): 600 TABLET, FILM COATED, EXTENDED RELEASE ORAL at 14:07

## 2024-07-29 RX ADMIN — Medication 1 MILLIGRAM(S): at 18:22

## 2024-07-29 RX ADMIN — Medication 1000 MILLILITER(S): at 14:48

## 2024-07-29 RX ADMIN — Medication 1000 MILLILITER(S): at 17:38

## 2024-07-29 NOTE — ED ADULT NURSE REASSESSMENT NOTE - NS ED NURSE REASSESS COMMENT FT1
pt left prior to repeat labs drawn. spoke to another staff member to report he was leaving. explained to patient prior that labs had to be re-drawn prior to going to detox. pt had verbalized understanding. pt requested to MD to leave.

## 2024-07-29 NOTE — ED PROVIDER NOTE - SKIN, MLM
Skin normal color for race, warm, dry and intact.  + multiple round scab lesions scattered on lower extremities bilaterally and sveral on upper extremities bilaterally. no purulent dc noted no surrounding signs of cellulitis

## 2024-07-29 NOTE — SBIRT NOTE ADULT - NSSBIRTDRGACTIVEREFTXDET_GEN_A_CORE
Screening results were reviewed with the patient. Patient was provided educational materials on low-risk guidelines and substance use and health. Motivation and goals were discussed. Patient provided with a referral to substance use treatment at [facility]

## 2024-07-29 NOTE — ED PROVIDER NOTE - OBJECTIVE STATEMENT
Patient is a pleasant 34-year-old male who presents to the emergency room with complaint of drinking alcohol every day for several weeks.  Patient has a longstanding history of alcohol intake.  Patient to ER today because " I need help."  Patient's last drink of alcohol was 3 AM today.  Patient states that he smoked an unknown white substance today.  Patient denies IV drug use. denies prior history of DT's     Patient also with a complaint of multiple scabs to lower extremities of unknown duration.  Patient has not seen a doctor for these scab/lesions.  Patient states that the lesions intermittently have clear discharge.    The patient denies the following symptoms:  headache, dizziness/lightheadedness, neck pain/neck stiffness, numbness/tingling, photophobia, change in vision/hearing/gait/mental status/speech,difficulty swallowing, focal weakness,, fever, chills, chest/neck/jaw/arm or back pain, palpitations, shortness of breath, diaphoresis, swelling to arm and/or legs, N/V/D, abdominal pain, abdominal distention, back pain, flank pain [id number] MR    Patient is a pleasant 34-year-old male who presents to the emergency room with complaint of drinking alcohol every day for several weeks.  Patient has a longstanding history of alcohol intake.  Patient to ER today because " I need help."  Patient's last drink of alcohol was 3 AM today.  Patient states that he smoked an unknown white substance today.  Patient denies IV drug use. denies prior history of DT's     Patient also with a complaint of multiple scabs to lower extremities of unknown duration.  Patient has not seen a doctor for these scab/lesions.  Patient states that the lesions intermittently have clear discharge.    The patient denies the following symptoms:  headache, dizziness/lightheadedness, neck pain/neck stiffness, numbness/tingling, photophobia, change in vision/hearing/gait/mental status/speech,difficulty swallowing, focal weakness,, fever, chills, chest/neck/jaw/arm or back pain, palpitations, shortness of breath, diaphoresis, swelling to arm and/or legs, N/V/D, abdominal pain, abdominal distention, back pain, flank pain

## 2024-07-29 NOTE — ED PROVIDER NOTE - PATIENT PORTAL LINK FT
You can access the FollowMyHealth Patient Portal offered by [facility] by registering at the following website: [email or web address]. By joining [employer]’s FollowMyHealth portal, you will also be able to view your health information using other applications (apps) compatible with our system.

## 2024-07-29 NOTE — ED PROVIDER NOTE - CARE PLAN
Principal Discharge DX:	Alcohol dependency  Secondary Diagnosis:	Rash   1 Principal Discharge DX:	Alcohol dependency  Secondary Diagnosis:	Rash  Secondary Diagnosis:	Methamphetamine use

## 2024-07-29 NOTE — ED PROVIDER NOTE - NSFOLLOWUPINSTRUCTIONS_ED_ALL_ED_FT
Alcohol Abuse    Alcohol intoxication occurs when the amount of alcohol that a person has consumed impairs his or her ability to mentally and physically function. Chronic alcohol consumption can also lead to a variety of health issues including neurological disease, stomach disease, heart disease, liver disease, etc. Do not drive after drinking alcohol. Drinking enough alcohol to end up in an Emergency Room suggests you may have an alcohol abuse problem. Seek help at a drug addiction center.    SEEK IMMEDIATE MEDICAL CARE IF YOU HAVE ANY OF THE FOLLOWING SYMPTOMS: seizures, vomiting blood, blood in your stool, lightheadedness/dizziness, or becoming shaky to tremulous when you stop drinking.    1. stay well hydrated  2. take all medications as directed without fail  3. follow up with your primary care doctor in 1 -2 days  4. return to ER if your symptoms worsen or for any other concern    Follow up with your primary care doctor or clinics listed below if you do not have a doctor  [address] [address]  To make an appointment, call [phone]  [facility]  Address: [address] [place] [address]  Appointment Center: [phone] ([phone])

## 2024-07-29 NOTE — ED ADULT TRIAGE NOTE - CHIEF COMPLAINT QUOTE
Pt BIBA from street with C/O multiple wounds/scabs to legs bilaterally. Tachycardic on arrival. Afebrile.

## 2024-07-29 NOTE — ED ADULT NURSE NOTE - OBJECTIVE STATEMENT
reports multiple wounds for several weeks now that are worsening. pt also reports feeling shakes and sweats as his last drink was 3am last night - drank primarily beers and vodka. reports drug use of unknown drug 2 days ago - reports still feeling effects from drug.

## 2024-07-29 NOTE — ED PROVIDER NOTE - CLINICAL SUMMARY MEDICAL DECISION MAKING FREE TEXT BOX
33 yo male with alcohol dependnacy and rash c/w with potential MRSA    plan for iv labs ekg iv abx and sbirt consult 35 yo male with alcohol dependancy and rash c/w with potential MRSA    plan for iv labs ekg iv abx and sbirt consult    bed [facility] reserved for patient to get inpatient detox  pt with progressive improvement throughout ed stay     at 715pm - pt stated that he no longer wants inpatient treatment at [facility] and wants to be discharged from ER   pt advised that rash appears like mrsa and recommended to continue to take doxy as outpatient - he is aware doxy has been sent to pharmacy     A medical screening examination was performed and no emergency medical condition was identified.    The patient's symptoms progressively improved throughout the ED stay.  The patient tolerated PO fluids.    At the time of discharge from the Emergency Department, the patient is alert with fluent appropriate speech and ambulatory without difficulty. The patient demonstrates capacity at time of discharge and verbally consents to discharge.     ED evaluation and management discussed with the patient and family (if available) in detail.  Close PMD and/or specialist follow up explained and encouraged.  Strict ED return instructions discussed in detail for any worsening or new symptoms. The patient and family (if present) was given the opportunity to ask questions about their ER evaluation, discharge diagnosis and discharge instructions. The patient verbalized understanding of this information and instructions and importantly the need to return to the ED for  worsening of illness or for any concern.    The patient received a printed version of the discharge instructions. The patient verbally expressed understanding that the Emergency Department diagnosis is a preliminary diagnosis often based on limited information and that the patient must adhere to the follow-up plan as discussed.

## 2024-07-29 NOTE — SBIRT NOTE ADULT - NSSBIRTALCACTIVEREFTXDET_GEN_A_CORE
Screening results were reviewed with the patient. Patient was provided educational materials on low-risk guidelines and substance use and health. Motivation and goals were discussed. Patient provided with a referral to substance use treatment at [facility]

## 2024-07-30 LAB
HCV AB S/CO SERPL IA: 0.2 S/CO — SIGNIFICANT CHANGE UP (ref 0–0.99)
HCV AB SERPL-IMP: SIGNIFICANT CHANGE UP

## 2024-09-09 NOTE — ED ADULT NURSE NOTE - CAS DISCH CONDITION
normal/ROM intact/normal gait/strength 5/5 bilateral upper extremities/strength 5/5 bilateral lower extremities
Stable

## 2024-09-27 ENCOUNTER — EMERGENCY (EMERGENCY)
Facility: HOSPITAL | Age: 34
LOS: 1 days | Discharge: HOME | End: 2024-09-27
Attending: EMERGENCY MEDICINE | Admitting: EMERGENCY MEDICINE
Payer: MEDICAID

## 2024-09-27 VITALS
HEART RATE: 97 BPM | DIASTOLIC BLOOD PRESSURE: 80 MMHG | OXYGEN SATURATION: 96 % | RESPIRATION RATE: 16 BRPM | SYSTOLIC BLOOD PRESSURE: 125 MMHG | TEMPERATURE: 98 F

## 2024-09-27 VITALS
RESPIRATION RATE: 18 BRPM | HEART RATE: 122 BPM | TEMPERATURE: 99 F | HEIGHT: 73 IN | DIASTOLIC BLOOD PRESSURE: 79 MMHG | SYSTOLIC BLOOD PRESSURE: 136 MMHG | WEIGHT: 198.42 LBS | OXYGEN SATURATION: 99 %

## 2024-09-27 DIAGNOSIS — Z98.890 OTHER SPECIFIED POSTPROCEDURAL STATES: Chronic | ICD-10-CM

## 2024-09-27 LAB
ALBUMIN SERPL ELPH-MCNC: 3.7 G/DL — SIGNIFICANT CHANGE UP (ref 3.4–5)
ALP SERPL-CCNC: 81 U/L — SIGNIFICANT CHANGE UP (ref 40–120)
ALT FLD-CCNC: 79 U/L — HIGH (ref 12–42)
AMPHET UR-MCNC: NEGATIVE — SIGNIFICANT CHANGE UP
ANION GAP SERPL CALC-SCNC: 13 MMOL/L — SIGNIFICANT CHANGE UP (ref 9–16)
APPEARANCE UR: CLEAR — SIGNIFICANT CHANGE UP
AST SERPL-CCNC: 63 U/L — HIGH (ref 15–37)
BARBITURATES UR SCN-MCNC: NEGATIVE — SIGNIFICANT CHANGE UP
BASOPHILS # BLD AUTO: 0.03 K/UL — SIGNIFICANT CHANGE UP (ref 0–0.2)
BASOPHILS NFR BLD AUTO: 0.3 % — SIGNIFICANT CHANGE UP (ref 0–2)
BENZODIAZ UR-MCNC: NEGATIVE — SIGNIFICANT CHANGE UP
BILIRUB SERPL-MCNC: 0.6 MG/DL — SIGNIFICANT CHANGE UP (ref 0.2–1.2)
BILIRUB UR-MCNC: NEGATIVE — SIGNIFICANT CHANGE UP
BUN SERPL-MCNC: 10 MG/DL — SIGNIFICANT CHANGE UP (ref 7–23)
CALCIUM SERPL-MCNC: 8 MG/DL — LOW (ref 8.5–10.5)
CHLORIDE SERPL-SCNC: 98 MMOL/L — SIGNIFICANT CHANGE UP (ref 96–108)
CO2 SERPL-SCNC: 28 MMOL/L — SIGNIFICANT CHANGE UP (ref 22–31)
COCAINE METAB.OTHER UR-MCNC: NEGATIVE — SIGNIFICANT CHANGE UP
COLOR SPEC: YELLOW — SIGNIFICANT CHANGE UP
CREAT SERPL-MCNC: 0.69 MG/DL — SIGNIFICANT CHANGE UP (ref 0.5–1.3)
DIFF PNL FLD: NEGATIVE — SIGNIFICANT CHANGE UP
EGFR: 124 ML/MIN/1.73M2 — SIGNIFICANT CHANGE UP
EOSINOPHIL # BLD AUTO: 0.01 K/UL — SIGNIFICANT CHANGE UP (ref 0–0.5)
EOSINOPHIL NFR BLD AUTO: 0.1 % — SIGNIFICANT CHANGE UP (ref 0–6)
ETHANOL SERPL-MCNC: 201 MG/DL — HIGH
FENTANYL UR QL SCN: NEGATIVE — SIGNIFICANT CHANGE UP
GLUCOSE SERPL-MCNC: 97 MG/DL — SIGNIFICANT CHANGE UP (ref 70–99)
GLUCOSE UR QL: NEGATIVE MG/DL — SIGNIFICANT CHANGE UP
HCT VFR BLD CALC: 46.3 % — SIGNIFICANT CHANGE UP (ref 39–50)
HGB BLD-MCNC: 16.7 G/DL — SIGNIFICANT CHANGE UP (ref 13–17)
IMM GRANULOCYTES NFR BLD AUTO: 0.2 % — SIGNIFICANT CHANGE UP (ref 0–0.9)
KETONES UR-MCNC: NEGATIVE MG/DL — SIGNIFICANT CHANGE UP
LEUKOCYTE ESTERASE UR-ACNC: NEGATIVE — SIGNIFICANT CHANGE UP
LIDOCAIN IGE QN: 17 U/L — SIGNIFICANT CHANGE UP (ref 16–77)
LYMPHOCYTES # BLD AUTO: 1.73 K/UL — SIGNIFICANT CHANGE UP (ref 1–3.3)
LYMPHOCYTES # BLD AUTO: 15 % — SIGNIFICANT CHANGE UP (ref 13–44)
MAGNESIUM SERPL-MCNC: 1.5 MG/DL — LOW (ref 1.6–2.6)
MCHC RBC-ENTMCNC: 34.2 PG — HIGH (ref 27–34)
MCHC RBC-ENTMCNC: 36.1 GM/DL — HIGH (ref 32–36)
MCV RBC AUTO: 94.7 FL — SIGNIFICANT CHANGE UP (ref 80–100)
METHADONE UR-MCNC: NEGATIVE — SIGNIFICANT CHANGE UP
MONOCYTES # BLD AUTO: 0.45 K/UL — SIGNIFICANT CHANGE UP (ref 0–0.9)
MONOCYTES NFR BLD AUTO: 3.9 % — SIGNIFICANT CHANGE UP (ref 2–14)
NEUTROPHILS # BLD AUTO: 9.32 K/UL — HIGH (ref 1.8–7.4)
NEUTROPHILS NFR BLD AUTO: 80.5 % — HIGH (ref 43–77)
NITRITE UR-MCNC: NEGATIVE — SIGNIFICANT CHANGE UP
NRBC # BLD: 0 /100 WBCS — SIGNIFICANT CHANGE UP (ref 0–0)
OPIATES UR-MCNC: NEGATIVE — SIGNIFICANT CHANGE UP
PCO2 BLDV: 41 MMHG — LOW (ref 42–55)
PCP SPEC-MCNC: SIGNIFICANT CHANGE UP
PCP UR-MCNC: NEGATIVE — SIGNIFICANT CHANGE UP
PH BLDV: 7.46 — HIGH (ref 7.32–7.43)
PH UR: 7 — SIGNIFICANT CHANGE UP (ref 5–8)
PLATELET # BLD AUTO: 327 K/UL — SIGNIFICANT CHANGE UP (ref 150–400)
PO2 BLDV: 71 MMHG — HIGH (ref 25–45)
POTASSIUM SERPL-MCNC: 3.5 MMOL/L — SIGNIFICANT CHANGE UP (ref 3.5–5.3)
POTASSIUM SERPL-SCNC: 3.5 MMOL/L — SIGNIFICANT CHANGE UP (ref 3.5–5.3)
PROT SERPL-MCNC: 7.4 G/DL — SIGNIFICANT CHANGE UP (ref 6.4–8.2)
PROT UR-MCNC: NEGATIVE MG/DL — SIGNIFICANT CHANGE UP
RBC # BLD: 4.89 M/UL — SIGNIFICANT CHANGE UP (ref 4.2–5.8)
RBC # FLD: 12.4 % — SIGNIFICANT CHANGE UP (ref 10.3–14.5)
SAO2 % BLDV: 92.7 % — HIGH (ref 67–88)
SODIUM SERPL-SCNC: 139 MMOL/L — SIGNIFICANT CHANGE UP (ref 132–145)
SP GR SPEC: 1.02 — SIGNIFICANT CHANGE UP (ref 1–1.03)
THC UR QL: NEGATIVE — SIGNIFICANT CHANGE UP
UROBILINOGEN FLD QL: 1 MG/DL — SIGNIFICANT CHANGE UP (ref 0.2–1)
WBC # BLD: 11.56 K/UL — HIGH (ref 3.8–10.5)
WBC # FLD AUTO: 11.56 K/UL — HIGH (ref 3.8–10.5)

## 2024-09-27 PROCEDURE — 74177 CT ABD & PELVIS W/CONTRAST: CPT | Mod: 26,MC

## 2024-09-27 PROCEDURE — 76705 ECHO EXAM OF ABDOMEN: CPT | Mod: 26

## 2024-09-27 PROCEDURE — 99285 EMERGENCY DEPT VISIT HI MDM: CPT

## 2024-09-27 RX ORDER — CHLORDIAZEPOXIDE HCL 5 MG
25 CAPSULE ORAL ONCE
Refills: 0 | Status: DISCONTINUED | OUTPATIENT
Start: 2024-09-27 | End: 2024-09-27

## 2024-09-27 RX ORDER — ONDANSETRON 2 MG/ML
4 INJECTION, SOLUTION INTRAMUSCULAR; INTRAVENOUS ONCE
Refills: 0 | Status: COMPLETED | OUTPATIENT
Start: 2024-09-27 | End: 2024-09-27

## 2024-09-27 RX ORDER — FAMOTIDINE 10 MG/ML
20 INJECTION INTRAVENOUS ONCE
Refills: 0 | Status: COMPLETED | OUTPATIENT
Start: 2024-09-27 | End: 2024-09-27

## 2024-09-27 RX ORDER — SODIUM CHLORIDE 9 MG/ML
1000 INJECTION INTRAMUSCULAR; INTRAVENOUS; SUBCUTANEOUS ONCE
Refills: 0 | Status: COMPLETED | OUTPATIENT
Start: 2024-09-27 | End: 2024-09-27

## 2024-09-27 RX ORDER — SUCRALFATE 1 G/10ML
1 SUSPENSION ORAL ONCE
Refills: 0 | Status: COMPLETED | OUTPATIENT
Start: 2024-09-27 | End: 2024-09-27

## 2024-09-27 RX ORDER — ACETAMINOPHEN 325 MG/1
650 TABLET ORAL ONCE
Refills: 0 | Status: COMPLETED | OUTPATIENT
Start: 2024-09-27 | End: 2024-09-27

## 2024-09-27 RX ADMIN — SODIUM CHLORIDE 1000 MILLILITER(S): 9 INJECTION INTRAMUSCULAR; INTRAVENOUS; SUBCUTANEOUS at 09:58

## 2024-09-27 RX ADMIN — SUCRALFATE 1 GRAM(S): 1 SUSPENSION ORAL at 16:11

## 2024-09-27 RX ADMIN — FAMOTIDINE 20 MILLIGRAM(S): 10 INJECTION INTRAVENOUS at 09:58

## 2024-09-27 RX ADMIN — ACETAMINOPHEN 650 MILLIGRAM(S): 325 TABLET ORAL at 16:11

## 2024-09-27 RX ADMIN — ONDANSETRON 4 MILLIGRAM(S): 2 INJECTION, SOLUTION INTRAMUSCULAR; INTRAVENOUS at 09:58

## 2024-09-27 RX ADMIN — Medication 25 MILLIGRAM(S): at 16:11

## 2024-09-27 RX ADMIN — Medication 25 GRAM(S): at 10:37

## 2024-09-27 NOTE — ED PROVIDER NOTE - PROGRESS NOTE DETAILS
Endorses additional nausea.  Abdomen exam challenging as patient gets nauseous and moves during exam.  Vitals improved.  RUQ US ordered and cannot get accurate examination.  GB dilated on CT scan without evidence of stones.  Magnesium low and replaced. labs reviewed and compared with previous ED visit,  Resting comfortably CIWA remains low and no vital sign abnormalities.  Endorses nausea but no vomiting since treated with antiemetics and tolerated PO liquids.  Refused solid food.  US without evidence of Ordered for tylenol and sucralfate.  Symptoms likely due to dyspepsia secondary to alcohol use.  Will discharge to [facility] custody.  And due to withdrawal risk will dose librium

## 2024-09-27 NOTE — ED PROVIDER NOTE - PATIENT PORTAL LINK FT
You can access the FollowMyHealth Patient Portal offered by [facility] by registering at the following website: [email or web address]. By joining [employer]’s FollowMyHealth portal, you will also be able to view your health information using other applications (apps) compatible with our system.

## 2024-09-27 NOTE — ED PROVIDER NOTE - CLINICAL SUMMARY MEDICAL DECISION MAKING FREE TEXT BOX
Presents in [facility] custody with abdominal pain, n/v worse since last night.  Denies radiation of pain, fever, chills.  Tachycardiac in triage, vitals quickly normalized.  Exam limited due to nausea but no focal tenderness or distention.  Not tremulous or diaphoretic and no tongue fasciculations.  Unkempt but not ill appearing.  Labs, fluids, antiemetics, imaging, and CIWA scoring.

## 2024-09-27 NOTE — ED ADULT NURSE NOTE - NSFALLUNIVINTERV_ED_ALL_ED
Bed/Stretcher in lowest position, wheels locked, appropriate side rails in place/Call bell, personal items and telephone in reach/Instruct patient to call for assistance before getting out of bed/chair/stretcher/Non-slip footwear applied when patient is off stretcher/[place] to call system/Physically safe environment - no spills, clutter or unnecessary equipment/Purposeful proactive rounding/Room/bathroom lighting operational, light cord in reach

## 2024-09-27 NOTE — ED ADULT NURSE NOTE - OBJECTIVE STATEMENT
Pt came in c/o generalized abdominal pain x 1 week and n/v x today. Pt reports daily etoh use(drinks 1pint of vodka a day). Last drink was 5-6AM. PMH etoh withdrawal seizures. No diarrhea or active vomiting. A&Ox4 speaking in full clear sentences. Respirations even and unlabored

## 2024-09-27 NOTE — ED PROVIDER NOTE - OBJECTIVE STATEMENT
Presents by EMS and [facility] custody/under arrest.  Hx of alcohol abuse with frequent ED visits for alcohol related complaints.  Today with one week of generalized abdominal pain, 7/10, aching, without radiation.  Associated n/v, worse since last night, clear and without bile.  Denies back pain or fever.  Last drink ~ 3 am (roughly 7 hours ago).  Drinks 2-3 pints per day.  Endorses history of alcohol withdrawal and seizures, but cannot recall last hospitalization.  Denies abdominal surgeries or prescribed medication use.  +tobacco but denies illicit drug use.

## 2024-09-27 NOTE — ED ADULT NURSE NOTE - PAIN: PRESENCE, MLM
Price (Do Not Change): 0.00 Detail Level: Simple Instructions: This plan will send the code FBSE to the PM system.  DO NOT or CHANGE the price. complains of pain/discomfort

## 2024-09-30 DIAGNOSIS — F17.200 NICOTINE DEPENDENCE, UNSPECIFIED, UNCOMPLICATED: ICD-10-CM

## 2024-09-30 DIAGNOSIS — R10.84 GENERALIZED ABDOMINAL PAIN: ICD-10-CM

## 2024-09-30 DIAGNOSIS — F10.10 ALCOHOL ABUSE, UNCOMPLICATED: ICD-10-CM

## 2024-09-30 DIAGNOSIS — R11.2 NAUSEA WITH VOMITING, UNSPECIFIED: ICD-10-CM

## 2024-10-11 NOTE — ED ADULT NURSE NOTE - NS_NURSE_DISC_TEACHING_YN_ED_ALL_ED
10/11/24 0920   IMM Letter   IMM Letter given to Patient/Family/Significant other/Guardian/POA/by: Domingo Dominguez   IMM Letter date given: 10/11/24   IMM Letter time given: 0910        Yes

## 2025-03-07 NOTE — ED PROVIDER NOTE - CHIEF COMPLAINT
Worsening SOB and cough, +burning chest pain Worsening SOB and cough, +burning chest pain The patient is a 34y Male complaining of wound check.

## 2025-04-01 NOTE — ED ADULT TRIAGE NOTE - AS HEIGHT TYPE
Alexander called stated that cholesterol pill is killing his back (Zetia) stated he cannot take it his back hurts so bad that 500 mg Tylenol does not even stop it.   stated

## 2025-04-21 NOTE — ED ADULT TRIAGE NOTE - STATUS:
HISTORY and PHYSICAL  [facility]       NAME:  Richard Dickens  MRN: [id number]   YOB: 1961   Date: 4/23/2025   Age: 63 y.o.  Gender: male     COMPLAINT AND PRESENT HISTORY:   Richard Dickens is 63 y.o.,  male, presents for pre-anesthesia/admission testing for OPEN UMBILICAL HERNIA REPAIR WITH MESH per Dr. Brown.  Primary dx: Umbilical hernia without obstruction or gangrene [K42.9].    4/29/2025 Scheduled for Colonoscopy diagnostic per Dr Brown   Dx History of colon polyps, Family history of colon cancer      Office note per Desirae Blandon NP on 3/17/2025  HISTORY OF PRESENT ILLNESS: 63 y.o. male presents to discuss possible umbilical hernia.     Bulge: Umbilical  Location of bulge: Umbilicus  Bulge growing in size: None  Bulge tenderness: None  Skin color changes to bulge: No  Reducibility: Yes  Constipation/diarrhea: None  Urination problems: None  Nausea/vomiting: None  Fever/chills: None  Prior hernia repairs: None  Prior imaging done: None  Most recent colonoscopy: Prior- about 5 years ago- denies polyps- states he think he is due now- established with GI     Major medical hx: HTN  Prior ABD/pelvic surgeries: None  Blood thinning medications: ASA    UPDATE 4/23/2025  Richard Dickens is 63 y.o.,  male, presents for pre-anesthesia/admission testing for OPEN UMBILICAL HERNIA REPAIR WITH MESH per Dr. Brown.  Primary dx: Umbilical hernia without obstruction or gangrene [K42.9].  Also scheduled for Colonoscopy Diagnostic per Dr Brown on 4/29/2025  Dx History of colon polyps, Family history of colon cancer    Pt noticed umbilical bulge 2 years ago.   The Hernia grew in size. The Hernia is non tender, reducible, denies expansile on coughing, no signs of bowl obstruction.    Previous Colonoscopy 5 years ago    Denies abdominal pain, dysphagia, heartburn.  Denies nausea, vomiting, diarrhea, constipation.  Denies blood in stool, dark tarry stools.  Denies changes in appetite and  Applied

## 2025-07-08 ENCOUNTER — EMERGENCY (EMERGENCY)
Facility: HOSPITAL | Age: 35
LOS: 1 days | End: 2025-07-08
Attending: EMERGENCY MEDICINE | Admitting: EMERGENCY MEDICINE
Payer: MEDICAID

## 2025-07-08 VITALS
HEART RATE: 98 BPM | RESPIRATION RATE: 18 BRPM | TEMPERATURE: 98 F | DIASTOLIC BLOOD PRESSURE: 82 MMHG | OXYGEN SATURATION: 96 % | SYSTOLIC BLOOD PRESSURE: 146 MMHG

## 2025-07-08 DIAGNOSIS — R46.0 VERY LOW LEVEL OF PERSONAL HYGIENE: ICD-10-CM
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
## 2025-07-08 DIAGNOSIS — Z98.890 OTHER SPECIFIED POSTPROCEDURAL STATES: Chronic | ICD-10-CM

## 2025-07-08 PROCEDURE — 99282 EMERGENCY DEPT VISIT SF MDM: CPT

## 2025-07-08 PROCEDURE — 99283 EMERGENCY DEPT VISIT LOW MDM: CPT | Mod: 25

## 2025-07-08 NOTE — ED PROVIDER NOTE - PATIENT PORTAL LINK FT
You can access the FollowMyHealth Patient Portal offered by [facility] by registering at the following website: [email or web address]. By joining [employer]’s FollowMyHealth portal, you will also be able to view your health information using other applications (apps) compatible with our system.

## 2025-07-08 NOTE — ED ADULT NURSE NOTE - OBJECTIVE STATEMENT
pt does not show withdrawal symptoms, alcohol odor on breath, stable on feet, asking for sandwich and socks and wants to leave

## 2025-07-08 NOTE — ED PROVIDER NOTE - PHYSICAL EXAMINATION
No clonus, rigidity, tremors, fasciculations. PERRL, EOMI, no nystagmus. Strength 5/5. Steady unassisted gait. Normal bowel sounds, skin temp/color.   Slight AOB  Pt has no tremors but will say "look, my hands are shaking" and purposely shake his hands.

## 2025-07-08 NOTE — ED PROVIDER NOTE - CLINICAL SUMMARY MEDICAL DECISION MAKING FREE TEXT BOX
35M PMH etoh abuse p/w "withdrawal." Pt states that he is withdrawing from alcohol, last drink a few hours ago. States he feels shaky. Also requesting food. No other systemic symptoms.   Vitals wnl, exam as above.   ddx: Possible very mild etoh intox. Clinically not withdrawing.   Giving food.   Discussed importance of outpt follow up and return precautions. Clinically no indication for further emergent ED workup or hospitalization at this time. Stable for dc, outpt f/u.

## 2025-07-08 NOTE — ED PROVIDER NOTE - OBJECTIVE STATEMENT
35M PMH etoh abuse p/w "withdrawal." Pt states that he is withdrawing from alcohol, last drink a few hours ago. States he feels shaky. Also requesting food. No other systemic symptoms.   Denies falls/trauma, SOB, CP, HA, NVD, abd pain, urinary complaints, URI symptoms.